# Patient Record
Sex: FEMALE | Race: WHITE | Employment: OTHER | URBAN - METROPOLITAN AREA
[De-identification: names, ages, dates, MRNs, and addresses within clinical notes are randomized per-mention and may not be internally consistent; named-entity substitution may affect disease eponyms.]

---

## 2017-02-21 ENCOUNTER — OFFICE VISIT (OUTPATIENT)
Dept: GASTROENTEROLOGY | Facility: CLINIC | Age: 82
End: 2017-02-21

## 2017-02-21 VITALS
WEIGHT: 125.63 LBS | SYSTOLIC BLOOD PRESSURE: 132 MMHG | HEIGHT: 66 IN | BODY MASS INDEX: 20.19 KG/M2 | HEART RATE: 70 BPM | DIASTOLIC BLOOD PRESSURE: 70 MMHG

## 2017-02-21 DIAGNOSIS — R10.9 ABDOMINAL PAIN IN FEMALE: Primary | ICD-10-CM

## 2017-02-21 DIAGNOSIS — R07.9 CHEST PAIN IN ADULT: ICD-10-CM

## 2017-02-21 PROBLEM — G89.29 CHRONIC PAIN OF RIGHT KNEE: Status: ACTIVE | Noted: 2017-02-21

## 2017-02-21 PROBLEM — M25.561 CHRONIC PAIN OF RIGHT KNEE: Status: ACTIVE | Noted: 2017-02-21

## 2017-02-21 PROBLEM — I10 ESSENTIAL HYPERTENSION: Status: ACTIVE | Noted: 2017-02-21

## 2017-02-21 PROCEDURE — 99214 OFFICE O/P EST MOD 30 MIN: CPT | Performed by: INTERNAL MEDICINE

## 2017-02-21 PROCEDURE — G0463 HOSPITAL OUTPT CLINIC VISIT: HCPCS | Performed by: INTERNAL MEDICINE

## 2017-02-21 NOTE — H&P
History of present illness: This is a very pleasant 41-year-old female referred by Dr. Marry Reilly as an evaluation for abdominal pain. The patient describes abdominal pain for at least 8 or 9 months.   She has 1-2 times per month abdominal pain which is i dyspepsia. She had transient elevated liver enzymes which resolved, suggesting possible intermittent right heart failure. I have advised that the patient proceed with her cardiac stress testing as scheduled for tomorrow.   Then, if stress test negative, t

## 2017-02-21 NOTE — PATIENT INSTRUCTIONS
1.  Cardiac stress testing done tomorrow as scheduled    2.   Schedule MR angiography, abdominal mesenteric vessels

## 2017-02-21 NOTE — PROGRESS NOTES
HPI:    Patient ID: Randy Schneider is a 80year old female. HPI    Review of Systems   Constitutional: Positive for appetite change, fatigue and unexpected weight change. Negative for fever, chills and diaphoresis.    HENT: Negative for congestion, ear p amiodarone HCl (PACERONE) 200 MG Oral Tab Take 100 mg by mouth daily. Disp:  Rfl:    Cholecalciferol (VITAMIN D3) 1000 UNITS Oral Cap Take 2 tablets by mouth daily.  Disp:  Rfl:    Calcium Carbonate Antacid 500 MG Oral Chew Tab Chew 1 tablet by mouth 2 content normal.   Nursing note and vitals reviewed. ASSESSMENT/PLAN:   Abdominal pain in female  (primary encounter diagnosis)  Chest pain in adult    No orders of the defined types were placed in this encounter.        Meds This Visit:  No pres

## 2017-03-08 ENCOUNTER — TELEPHONE (OUTPATIENT)
Dept: GASTROENTEROLOGY | Facility: CLINIC | Age: 82
End: 2017-03-08

## 2017-03-08 ENCOUNTER — HOSPITAL ENCOUNTER (OUTPATIENT)
Dept: MRI IMAGING | Facility: HOSPITAL | Age: 82
Discharge: HOME | End: 2017-03-08
Attending: INTERNAL MEDICINE
Payer: MEDICARE

## 2017-03-08 DIAGNOSIS — R10.9 ABDOMINAL PAIN IN FEMALE: ICD-10-CM

## 2017-03-08 DIAGNOSIS — K55.059 OCCLUSIVE MESENTERIC ISCHEMIA (HCC): ICD-10-CM

## 2017-03-08 DIAGNOSIS — K55.059 OCCLUSIVE MESENTERIC ISCHEMIA (HCC): Primary | ICD-10-CM

## 2017-03-08 LAB — CREAT BLD-MCNC: 1 MG/DL (ref 0.5–1.5)

## 2017-03-08 PROCEDURE — A9575 INJ GADOTERATE MEGLUMI 0.1ML: HCPCS | Performed by: INTERNAL MEDICINE

## 2017-03-08 PROCEDURE — 82565 ASSAY OF CREATININE: CPT

## 2017-03-08 PROCEDURE — 74185 MRA ABD W OR W/O CNTRST: CPT

## 2017-03-08 NOTE — TELEPHONE ENCOUNTER
Dr. Lynn Altamirano - Sending as a high priority and paging. Sindhu Singh, RN from radiology called ([phone]) and states that  1)The order was placed incorrectly. MRI abd was placed. They need an MR Angiography order.    2) Medicare will not cover the current dx listed (abd

## 2017-03-08 NOTE — TELEPHONE ENCOUNTER
Per Dr. Mira Benavides, new order placed for MRA (MRI Angiography 79777) with and without contrast with Dx code 9 [facility]. 059 - venous occlusion, suspected mesenteric ischemia. Vandana Russell RN phoned back [facility] in Radiology and informed order was on it's way.    Confirmed ins

## 2017-03-10 ENCOUNTER — TELEPHONE (OUTPATIENT)
Dept: GASTROENTEROLOGY | Facility: CLINIC | Age: 82
End: 2017-03-10

## 2017-03-10 NOTE — TELEPHONE ENCOUNTER
Message is left with pt.'s  for pt to call back. Dr. Valerio Figueroa was made aware of appt with Dr. Alexandria Stanley next week.

## 2017-03-10 NOTE — TELEPHONE ENCOUNTER
Juan Ramon Herrera at Dr. Josesito Fernandez office is contacted re: below; appt booked for 3/15/17 at 1430- she will call pt re: this.

## 2017-03-10 NOTE — TELEPHONE ENCOUNTER
Pt is contacted and made aware of below communication from Dr. Yamilka Ennis as well as appt on 3/15/17 at [phone] with Dr. Ubaldo Lares; pt is agreeable with plan; she is made aware that Dr. Hopkins [facility] office will be contacted to call pt with information re: location for appt.

## 2017-03-10 NOTE — TELEPHONE ENCOUNTER
I left a message on the patient's voicemail regarding her MRA results. 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 She needs an appointment with Dr. Theodora Porter as soon as possible.   When she calls back

## 2017-03-13 NOTE — TELEPHONE ENCOUNTER
Per Dr. Km Rosales office, pt should park in yellow parking lot at [facility] and go to third floor, [address].

## 2017-03-23 ENCOUNTER — HOSPITAL ENCOUNTER (OUTPATIENT)
Dept: INTERVENTIONAL RADIOLOGY/VASCULAR | Facility: HOSPITAL | Age: 82
Discharge: HOME | End: 2017-03-23
Attending: RADIOLOGY | Admitting: RADIOLOGY
Payer: MEDICARE

## 2017-03-23 VITALS
HEIGHT: 66 IN | DIASTOLIC BLOOD PRESSURE: 59 MMHG | WEIGHT: 125 LBS | OXYGEN SATURATION: 98 % | BODY MASS INDEX: 20.09 KG/M2 | SYSTOLIC BLOOD PRESSURE: 102 MMHG | HEART RATE: 50 BPM | RESPIRATION RATE: 14 BRPM

## 2017-03-23 DIAGNOSIS — K55.9 MESENTERIC ISCHEMIA (HCC): ICD-10-CM

## 2017-03-23 PROCEDURE — 04H53DZ INSERTION OF INTRALUMINAL DEVICE INTO SUPERIOR MESENTERIC ARTERY, PERCUTANEOUS APPROACH: ICD-10-PCS | Performed by: RADIOLOGY

## 2017-03-23 PROCEDURE — 85347 COAGULATION TIME ACTIVATED: CPT

## 2017-03-23 PROCEDURE — 04753DZ DILATION OF SUPERIOR MESENTERIC ARTERY WITH INTRALUMINAL DEVICE, PERCUTANEOUS APPROACH: ICD-10-PCS | Performed by: RADIOLOGY

## 2017-03-23 PROCEDURE — 80048 BASIC METABOLIC PNL TOTAL CA: CPT | Performed by: RADIOLOGY

## 2017-03-23 PROCEDURE — 85027 COMPLETE CBC AUTOMATED: CPT | Performed by: RADIOLOGY

## 2017-03-23 PROCEDURE — 36246 INS CATH ABD/L-EXT ART 2ND: CPT

## 2017-03-23 PROCEDURE — 37236 OPEN/PERQ PLACE STENT 1ST: CPT

## 2017-03-23 PROCEDURE — 75726 ARTERY X-RAYS ABDOMEN: CPT

## 2017-03-23 RX ORDER — SODIUM CHLORIDE 9 MG/ML
INJECTION, SOLUTION INTRAVENOUS CONTINUOUS
Status: DISCONTINUED | OUTPATIENT
Start: 2017-03-23 | End: 2017-03-23

## 2017-03-23 RX ORDER — ASPIRIN 325 MG
325 TABLET ORAL DAILY
Qty: 30 TABLET | Refills: 0 | Status: SHIPPED | OUTPATIENT
Start: 2017-03-23 | End: 2017-04-18 | Stop reason: ALTCHOICE

## 2017-03-23 RX ORDER — HEPARIN SODIUM 1000 [USP'U]/ML
1000 INJECTION, SOLUTION INTRAVENOUS; SUBCUTANEOUS AS NEEDED
Status: DISCONTINUED | OUTPATIENT
Start: 2017-03-23 | End: 2017-03-23

## 2017-03-23 RX ORDER — SODIUM CHLORIDE 9 MG/ML
500 INJECTION, SOLUTION INTRAVENOUS CONTINUOUS
Status: DISCONTINUED | OUTPATIENT
Start: 2017-03-23 | End: 2017-03-23

## 2017-03-23 RX ORDER — ASPIRIN 325 MG
325 TABLET ORAL DAILY
Status: DISCONTINUED | OUTPATIENT
Start: 2017-03-23 | End: 2017-03-23

## 2017-03-23 RX ORDER — MIDAZOLAM HYDROCHLORIDE 1 MG/ML
1 INJECTION INTRAMUSCULAR; INTRAVENOUS EVERY 5 MIN PRN
Status: DISCONTINUED | OUTPATIENT
Start: 2017-03-23 | End: 2017-03-23

## 2017-03-23 RX ORDER — HEPARIN SODIUM 1000 [USP'U]/ML
INJECTION, SOLUTION INTRAVENOUS; SUBCUTANEOUS
Status: COMPLETED
Start: 2017-03-23 | End: 2017-03-23

## 2017-03-23 RX ORDER — MIDAZOLAM HYDROCHLORIDE 1 MG/ML
INJECTION INTRAMUSCULAR; INTRAVENOUS
Status: COMPLETED
Start: 2017-03-23 | End: 2017-03-23

## 2017-03-23 RX ORDER — SODIUM CHLORIDE 9 MG/ML
INJECTION, SOLUTION INTRAVENOUS
Status: COMPLETED
Start: 2017-03-23 | End: 2017-03-23

## 2017-03-23 RX ADMIN — HEPARIN SODIUM 5000 UNITS: 1000 INJECTION, SOLUTION INTRAVENOUS; SUBCUTANEOUS at 09:40:00

## 2017-03-23 RX ADMIN — MIDAZOLAM HYDROCHLORIDE 1 MG: 1 INJECTION INTRAMUSCULAR; INTRAVENOUS at 09:27:00

## 2017-03-23 RX ADMIN — SODIUM CHLORIDE 500 ML/HR: 9 INJECTION, SOLUTION INTRAVENOUS at 09:15:00

## 2017-03-23 RX ADMIN — MIDAZOLAM HYDROCHLORIDE 0.5 MG: 1 INJECTION INTRAMUSCULAR; INTRAVENOUS at 09:51:00

## 2017-03-23 RX ADMIN — HEPARIN SODIUM 3000 UNITS: 1000 INJECTION, SOLUTION INTRAVENOUS; SUBCUTANEOUS at 09:00:00

## 2017-03-23 NOTE — PROGRESS NOTES
Pt post angiogram. Pt was able to eat no complaint of nausea or vomiting. Pt ambulated in the room. Site remained clean dry and intact. Discharge instructions given to pt and family at bedside.  Pt discharged by wheelchair with family

## 2017-04-18 ENCOUNTER — OFFICE VISIT (OUTPATIENT)
Dept: GASTROENTEROLOGY | Facility: CLINIC | Age: 82
End: 2017-04-18

## 2017-04-18 VITALS
HEIGHT: 67 IN | WEIGHT: 124.81 LBS | SYSTOLIC BLOOD PRESSURE: 78 MMHG | HEART RATE: 56 BPM | DIASTOLIC BLOOD PRESSURE: 31 MMHG | BODY MASS INDEX: 19.59 KG/M2

## 2017-04-18 DIAGNOSIS — K55.9 MESENTERIC ISCHEMIA (HCC): Primary | ICD-10-CM

## 2017-04-18 DIAGNOSIS — Z87.19 HX OF GASTROESOPHAGEAL REFLUX (GERD): ICD-10-CM

## 2017-04-18 PROCEDURE — G0463 HOSPITAL OUTPT CLINIC VISIT: HCPCS | Performed by: INTERNAL MEDICINE

## 2017-04-18 PROCEDURE — 99214 OFFICE O/P EST MOD 30 MIN: CPT | Performed by: INTERNAL MEDICINE

## 2017-04-18 RX ORDER — APIXABAN 5 MG/1
5 TABLET, FILM COATED ORAL 2 TIMES DAILY
Refills: 6 | COMMUNITY
Start: 2017-04-06 | End: 2018-03-22 | Stop reason: DRUGHIGH

## 2017-04-18 RX ORDER — ENALAPRIL MALEATE 2.5 MG/1
TABLET ORAL DAILY
Refills: 2 | COMMUNITY
Start: 2017-04-05 | End: 2020-11-12

## 2017-04-18 RX ORDER — FAMOTIDINE 10 MG
10 TABLET ORAL 2 TIMES DAILY
COMMUNITY
End: 2017-07-10

## 2017-04-18 NOTE — PATIENT INSTRUCTIONS
1.  Continue famotidine 10 mg twice daily as needed for GERD. 2.  Return visit in one year     3. Call if abdominal pain recurs.

## 2017-04-18 NOTE — H&P
History of present illness: This is an 57-year-old female patient of Dr. Hanna Restrepo who I last saw on February of this year. At that time she had presented with abdominal pain which was suggestive of possible mesenteric ischemia.   She underwent magnetic

## 2017-04-18 NOTE — PROGRESS NOTES
HPI:    Patient ID: Ruba Selby is a 80year old female. HPI    Review of Systems   Constitutional: Positive for fatigue. Negative for fever, chills, diaphoresis, activity change, appetite change and unexpected weight change.    HENT: Negative for con Take 1 tablet (40 mg total) by mouth nightly. Disp:  Rfl: 0   amiodarone HCl (PACERONE) 200 MG Oral Tab Take 100 mg by mouth daily. Disp:  Rfl:    Cholecalciferol (VITAMIN D3) 1000 UNITS Oral Cap Take 2 tablets by mouth daily.  Disp:  Rfl:      Allergies: diaphoretic. No erythema. No pallor. Psychiatric: She has a normal mood and affect. Her behavior is normal. Judgment and thought content normal.   Nursing note and vitals reviewed.              ASSESSMENT/PLAN:   Mesenteric ischemia (hcc)  (primary encoun

## 2017-06-20 ENCOUNTER — TELEPHONE (OUTPATIENT)
Dept: GASTROENTEROLOGY | Facility: CLINIC | Age: 82
End: 2017-06-20

## 2017-06-20 ENCOUNTER — OFFICE VISIT (OUTPATIENT)
Dept: GASTROENTEROLOGY | Facility: CLINIC | Age: 82
End: 2017-06-20

## 2017-06-20 VITALS
DIASTOLIC BLOOD PRESSURE: 54 MMHG | BODY MASS INDEX: 19 KG/M2 | HEART RATE: 54 BPM | SYSTOLIC BLOOD PRESSURE: 106 MMHG | WEIGHT: 124.19 LBS

## 2017-06-20 DIAGNOSIS — K55.9 MESENTERIC ISCHEMIA (HCC): ICD-10-CM

## 2017-06-20 DIAGNOSIS — Z87.19 HX OF GASTROESOPHAGEAL REFLUX (GERD): Primary | ICD-10-CM

## 2017-06-20 DIAGNOSIS — B96.81 HELICOBACTER PYLORI GASTRITIS: ICD-10-CM

## 2017-06-20 DIAGNOSIS — R43.2 DYSGEUSIA: ICD-10-CM

## 2017-06-20 DIAGNOSIS — K29.70 HELICOBACTER PYLORI GASTRITIS: ICD-10-CM

## 2017-06-20 PROCEDURE — G0463 HOSPITAL OUTPT CLINIC VISIT: HCPCS | Performed by: INTERNAL MEDICINE

## 2017-06-20 PROCEDURE — 99214 OFFICE O/P EST MOD 30 MIN: CPT | Performed by: INTERNAL MEDICINE

## 2017-06-20 RX ORDER — RABEPRAZOLE SODIUM 20 MG/1
20 TABLET, DELAYED RELEASE ORAL DAILY
Qty: 30 TABLET | Refills: 11 | Status: SHIPPED | OUTPATIENT
Start: 2017-06-20 | End: 2017-06-20

## 2017-06-20 RX ORDER — ESOMEPRAZOLE MAGNESIUM 40 MG/1
40 CAPSULE, DELAYED RELEASE ORAL
Qty: 30 CAPSULE | Refills: 6 | Status: SHIPPED | OUTPATIENT
Start: 2017-06-20 | End: 2017-07-10

## 2017-06-20 NOTE — H&P
History of present illness: This is an 51-year-old female patient of Dr. Erasmo Cooper who I last saw in April of this year.   At that time she had presented with abdominal pain and underwent magnetic resonance angiography and was found to have a stenosis of

## 2017-06-20 NOTE — PATIENT INSTRUCTIONS
1.  Trial of rabeprazole 20 mg 30 minutes before breakfast    2. Okay to continue cimetidine for breakthrough symptoms twice daily    3. Check stool test for H. pylori antigen    4.   Continue antireflux precautions

## 2017-06-20 NOTE — PROGRESS NOTES
HPI:    Patient ID: Varghese Louise is a 80year old female. HPI    Review of Systems   Constitutional: Positive for fatigue. Negative for fever, chills, diaphoresis, activity change, appetite change and unexpected weight change.    HENT: Negative for con tablet Rfl: 3   furosemide 20 MG Oral Tab Take 1 tablet (20 mg total) by mouth daily.  Disp: 90 tablet Rfl: 3   CARVEDILOL 6.25 MG Oral Tab TAKE 1 TABLET TWICE A DAY WITH MEALS Disp: 60 tablet Rfl: 6   Pravastatin Sodium 40 MG Oral Tab Take 1 tablet (40 mg adenopathy. Neurological: She is alert and oriented to person, place, and time. Skin: Skin is warm and dry. No rash noted. She is not diaphoretic. No erythema. No pallor. Psychiatric: She has a normal mood and affect.  Her behavior is normal. Judgment

## 2017-06-20 NOTE — TELEPHONE ENCOUNTER
[facility] called regarding RX RABEprazole not covered by ins. Pharmacy indicates that their are three medications currently covered listed under a different name: Omeprazole; Tantoprazole; Nexium.   Pls call Beth Stern at: [phone], thank you

## 2017-06-27 ENCOUNTER — APPOINTMENT (OUTPATIENT)
Dept: LAB | Facility: HOSPITAL | Age: 82
End: 2017-06-27
Attending: INTERNAL MEDICINE
Payer: MEDICARE

## 2017-06-27 ENCOUNTER — TELEPHONE (OUTPATIENT)
Dept: NEUROLOGY | Facility: CLINIC | Age: 82
End: 2017-06-27

## 2017-06-27 DIAGNOSIS — K29.70 HELICOBACTER PYLORI GASTRITIS: ICD-10-CM

## 2017-06-27 DIAGNOSIS — B96.81 HELICOBACTER PYLORI GASTRITIS: ICD-10-CM

## 2017-06-27 PROCEDURE — 87338 HPYLORI STOOL AG IA: CPT

## 2017-06-28 ENCOUNTER — TELEPHONE (OUTPATIENT)
Dept: NEUROLOGY | Facility: CLINIC | Age: 82
End: 2017-06-28

## 2017-06-28 ENCOUNTER — LAB ENCOUNTER (OUTPATIENT)
Dept: LAB | Facility: HOSPITAL | Age: 82
End: 2017-06-28
Attending: Other
Payer: MEDICARE

## 2017-06-28 ENCOUNTER — OFFICE VISIT (OUTPATIENT)
Dept: NEUROLOGY | Facility: CLINIC | Age: 82
End: 2017-06-28

## 2017-06-28 VITALS
SYSTOLIC BLOOD PRESSURE: 114 MMHG | HEART RATE: 64 BPM | HEIGHT: 66 IN | RESPIRATION RATE: 16 BRPM | WEIGHT: 124 LBS | DIASTOLIC BLOOD PRESSURE: 60 MMHG | BODY MASS INDEX: 19.93 KG/M2

## 2017-06-28 DIAGNOSIS — G31.84 MCI (MILD COGNITIVE IMPAIRMENT) WITH MEMORY LOSS: ICD-10-CM

## 2017-06-28 DIAGNOSIS — G62.9 DEMYELINATING NEUROPATHY: Primary | ICD-10-CM

## 2017-06-28 DIAGNOSIS — G62.9 NEUROPATHY: ICD-10-CM

## 2017-06-28 DIAGNOSIS — G62.9 NEUROPATHY: Primary | ICD-10-CM

## 2017-06-28 PROCEDURE — 84425 ASSAY OF VITAMIN B-1: CPT

## 2017-06-28 PROCEDURE — 86038 ANTINUCLEAR ANTIBODIES: CPT | Performed by: OTHER

## 2017-06-28 PROCEDURE — 36415 COLL VENOUS BLD VENIPUNCTURE: CPT

## 2017-06-28 PROCEDURE — 86039 ANTINUCLEAR ANTIBODIES (ANA): CPT | Performed by: OTHER

## 2017-06-28 PROCEDURE — 99204 OFFICE O/P NEW MOD 45 MIN: CPT | Performed by: OTHER

## 2017-06-28 PROCEDURE — 86334 IMMUNOFIX E-PHORESIS SERUM: CPT

## 2017-06-28 PROCEDURE — 84165 PROTEIN E-PHORESIS SERUM: CPT

## 2017-06-28 NOTE — PROGRESS NOTES
She relates a 4–5 year history of numbness paresthesias in the toes feet which has progressed, ascending to the distal calf. There is episodic right ulnar numbness paresthesias. She denies actual pain in the feet. No weakness in the hands.   Long history (PACERONE) 200 MG Oral Tab Take 200 mg by mouth daily. Disp:  Rfl:    Cholecalciferol (VITAMIN D3) 1000 UNITS Oral Cap Take 2 tablets by mouth daily.  Disp:  Rfl:    Esomeprazole Magnesium 40 MG Oral Capsule Delayed Release Take 1 capsule (40 mg total) by Number of children:               Social History Main Topics    Smoking status: Never Smoker                                                                Smokeless tobacco: Never Used                        Alcohol use:  No              Drug use: N to Vibration, temperature, fine touch, proprioception and pin prick to the UE and LE-except for vibration -2 -3 at the ankles. Decrease light touch symmetrically starting of the distal calf. Slight decreased temperature sensation of the feet.   No objecti total) by mouth daily. Disp: 90 tablet Rfl: 3   CARVEDILOL 6.25 MG Oral Tab TAKE 1 TABLET TWICE A DAY WITH MEALS Disp: 60 tablet Rfl: 6   Pravastatin Sodium 40 MG Oral Tab Take 1 tablet (40 mg total) by mouth nightly.  Disp:  Rfl: 0   amiodarone HCl (PACERO

## 2017-06-28 NOTE — TELEPHONE ENCOUNTER
Pt. informed insurance was verified and CT brain/head wo is a covered benefit and does not require authorization. Can proceed with scheduling appt. Ct appt. is on 07/06/17.

## 2017-06-29 LAB — HELICOBACTER PYLORI AG, FECAL: NEGATIVE

## 2017-07-05 ENCOUNTER — TELEPHONE (OUTPATIENT)
Dept: GASTROENTEROLOGY | Facility: CLINIC | Age: 82
End: 2017-07-05

## 2017-07-06 ENCOUNTER — TELEPHONE (OUTPATIENT)
Dept: GASTROENTEROLOGY | Facility: CLINIC | Age: 82
End: 2017-07-06

## 2017-07-06 ENCOUNTER — HOSPITAL ENCOUNTER (OUTPATIENT)
Dept: CT IMAGING | Facility: HOSPITAL | Age: 82
Discharge: HOME | End: 2017-07-06
Attending: Other
Payer: MEDICARE

## 2017-07-06 DIAGNOSIS — G31.84 MCI (MILD COGNITIVE IMPAIRMENT) WITH MEMORY LOSS: ICD-10-CM

## 2017-07-06 PROCEDURE — 70450 CT HEAD/BRAIN W/O DYE: CPT | Performed by: OTHER

## 2017-07-10 PROBLEM — E78.00 PURE HYPERCHOLESTEROLEMIA: Status: ACTIVE | Noted: 2017-07-10

## 2017-07-10 PROBLEM — G60.8 SENSORY PERIPHERAL NEUROPATHY: Status: ACTIVE | Noted: 2017-07-10

## 2017-07-10 PROBLEM — R41.3 MEMORY DEFICIT: Status: ACTIVE | Noted: 2017-07-10

## 2017-07-26 ENCOUNTER — OFFICE VISIT (OUTPATIENT)
Dept: PHYSICAL THERAPY | Facility: HOSPITAL | Age: 82
End: 2017-07-26
Attending: ORTHOPAEDIC SURGERY
Payer: MEDICARE

## 2017-07-26 DIAGNOSIS — M17.0 OSTEOARTHRITIS OF BOTH KNEES: ICD-10-CM

## 2017-07-26 DIAGNOSIS — M41.9 SCOLIOSIS, UNSPECIFIED: ICD-10-CM

## 2017-07-26 DIAGNOSIS — M99.06 SEGMENTAL AND SOMATIC DYSFUNCTION OF LOWER EXTREMITY: ICD-10-CM

## 2017-07-26 DIAGNOSIS — M54.16 LUMBAR RADICULOPATHY: ICD-10-CM

## 2017-07-26 DIAGNOSIS — M99.04 SEGMENTAL AND SOMATIC DYSFUNCTION OF SACRAL REGION: ICD-10-CM

## 2017-07-26 DIAGNOSIS — G57.62 LESION OF LEFT PLANTAR NERVE: ICD-10-CM

## 2017-07-26 DIAGNOSIS — M46.1 SACROILIITIS, NOT ELSEWHERE CLASSIFIED (HCC): ICD-10-CM

## 2017-07-26 PROCEDURE — 97162 PT EVAL MOD COMPLEX 30 MIN: CPT | Performed by: PHYSICAL THERAPIST

## 2017-07-26 NOTE — PROGRESS NOTES
LUMBAR SPINE EVALUATION:   Referring Physician: Dr. 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     Precautions: None     OBJECTIVE:   Observation/Posture: Poor B rounded shoulders and slouched at the L-spine  Response to OC posture:NE  Response to slouch posture: NE      Strength   Right Left Comments   Hip Flexio <3sec, 0 needs help from falling)  ___4____7. Standing with feet together  (4 1min, 3 1 min supervision, 2 30sec, 2 needs help to attain position and 15sec, 0 need help <15sec)  ___3____8.  Reaching forward with outstretched arm (4 >10\", 3 >5\", 2 >2\", 1 Patient will be seen for 2x/week or a total of 10 visits over a 90 day period. Treatment will include: Manual Therapy; Therapeutic Exercises; Neuromuscular Re-education; Therapeutic Activity; Ultrasound;  Electrical Stim; Traction; Patient education: Home

## 2017-07-31 ENCOUNTER — OFFICE VISIT (OUTPATIENT)
Dept: PHYSICAL THERAPY | Facility: HOSPITAL | Age: 82
End: 2017-07-31
Attending: ORTHOPAEDIC SURGERY
Payer: MEDICARE

## 2017-07-31 PROCEDURE — 97110 THERAPEUTIC EXERCISES: CPT

## 2017-07-31 NOTE — PROGRESS NOTES
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

## 2017-08-03 ENCOUNTER — OFFICE VISIT (OUTPATIENT)
Dept: PHYSICAL THERAPY | Facility: HOSPITAL | Age: 82
End: 2017-08-03
Attending: ORTHOPAEDIC SURGERY
Payer: MEDICARE

## 2017-08-03 PROCEDURE — 97140 MANUAL THERAPY 1/> REGIONS: CPT

## 2017-08-03 PROCEDURE — 97110 THERAPEUTIC EXERCISES: CPT

## 2017-08-08 ENCOUNTER — OFFICE VISIT (OUTPATIENT)
Dept: PHYSICAL THERAPY | Facility: HOSPITAL | Age: 82
End: 2017-08-08
Attending: ORTHOPAEDIC SURGERY
Payer: MEDICARE

## 2017-08-08 PROCEDURE — 97140 MANUAL THERAPY 1/> REGIONS: CPT | Performed by: PHYSICAL THERAPIST

## 2017-08-08 PROCEDURE — 97110 THERAPEUTIC EXERCISES: CPT | Performed by: PHYSICAL THERAPIST

## 2017-08-10 NOTE — PROGRESS NOTES
Her main concern is memory, occasional word finding difficulty. I reviewed all her blood tests, CT the head. No change in other neurological symptoms. No headache or neck pain. No symptoms in the upper extremities.     Review of systems negative in 12 f

## 2017-08-11 ENCOUNTER — OFFICE VISIT (OUTPATIENT)
Dept: PHYSICAL THERAPY | Facility: HOSPITAL | Age: 82
End: 2017-08-11
Attending: ORTHOPAEDIC SURGERY
Payer: MEDICARE

## 2017-08-11 PROCEDURE — 97110 THERAPEUTIC EXERCISES: CPT | Performed by: PHYSICAL THERAPIST

## 2017-08-11 PROCEDURE — 97112 NEUROMUSCULAR REEDUCATION: CPT | Performed by: PHYSICAL THERAPIST

## 2017-08-14 ENCOUNTER — OFFICE VISIT (OUTPATIENT)
Dept: PHYSICAL THERAPY | Facility: HOSPITAL | Age: 82
End: 2017-08-14
Attending: ORTHOPAEDIC SURGERY
Payer: MEDICARE

## 2017-08-14 PROCEDURE — 97116 GAIT TRAINING THERAPY: CPT

## 2017-08-14 PROCEDURE — 97112 NEUROMUSCULAR REEDUCATION: CPT

## 2017-08-14 PROCEDURE — 97110 THERAPEUTIC EXERCISES: CPT

## 2017-08-14 NOTE — PROGRESS NOTES
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

## 2017-08-23 ENCOUNTER — OFFICE VISIT (OUTPATIENT)
Dept: PHYSICAL THERAPY | Facility: HOSPITAL | Age: 82
End: 2017-08-23
Attending: ORTHOPAEDIC SURGERY
Payer: MEDICARE

## 2017-08-23 PROCEDURE — 97110 THERAPEUTIC EXERCISES: CPT | Performed by: PHYSICAL THERAPIST

## 2017-08-25 ENCOUNTER — OFFICE VISIT (OUTPATIENT)
Dept: PHYSICAL THERAPY | Facility: HOSPITAL | Age: 82
End: 2017-08-25
Attending: ORTHOPAEDIC SURGERY
Payer: MEDICARE

## 2017-08-25 PROCEDURE — 97110 THERAPEUTIC EXERCISES: CPT | Performed by: PHYSICAL THERAPIST

## 2017-08-25 NOTE — PROGRESS NOTES
Patient Name: Melinda Patel, : 10/17/1932, MRN: [id number]   Date:  2017  Referring Physician:  Nathaniel Lambert    Diagnosis: No diagnosis found. Progress note    Pt has attended 7, cancelled 0, and no shown 0 visits in Physical Therapy.      P 15sec, 0 need help <15sec)  ___4____8. Reaching forward with outstretched arm (4 >10\", 3 >5\", 2 >2\", 1 supervision, 0 needs help to prevent from falling)  ___4____9.  Retrieving object from floor (4 easy, 3 supervision, 2 unable but reaches 1-2\", 1unabl actively participate in planning and for this course of care. Thank you for your referral. Please co-sign or sign and return this letter via fax as soon as possible to [phone]. If you have any questions, please contact me at Dept: [phone]. 15 each -not today    Ice pack to B glut in supine x 10 minutes    Assessment:See progress note  HEP See pt instructions      Plan: See progress note        Charges:  TherEx3    Total Timed Treatment: 38 min  Total Treatment Time: 58 min

## 2017-09-27 NOTE — PROGRESS NOTES
Patient Name: Chris Coats, : 10/17/1932, MRN: [id number]   Date:  2017  Referring Physician:  Vida Rizo    Diagnosis: Scoliosis, unspecified (M41.9)  Osteoarthritis of both knees (M17.0)  Lumbar radiculopathy (M54.16)  Lesion of left plan

## 2017-10-03 ENCOUNTER — LAB ENCOUNTER (OUTPATIENT)
Dept: LAB | Facility: HOSPITAL | Age: 82
End: 2017-10-03
Attending: INTERNAL MEDICINE
Payer: MEDICARE

## 2017-10-03 DIAGNOSIS — I48.91 ATRIAL FIBRILLATION (HCC): Primary | ICD-10-CM

## 2017-10-03 PROCEDURE — 36415 COLL VENOUS BLD VENIPUNCTURE: CPT

## 2017-10-03 PROCEDURE — 85520 HEPARIN ASSAY: CPT

## 2018-07-20 ENCOUNTER — TELEPHONE (OUTPATIENT)
Dept: PHYSICAL THERAPY | Facility: HOSPITAL | Age: 83
End: 2018-07-20

## 2018-07-23 ENCOUNTER — OFFICE VISIT (OUTPATIENT)
Dept: PHYSICAL THERAPY | Facility: HOSPITAL | Age: 83
End: 2018-07-23
Attending: ORTHOPAEDIC SURGERY
Payer: MEDICARE

## 2018-07-23 DIAGNOSIS — M54.50 LUMBAR PAIN: ICD-10-CM

## 2018-07-23 DIAGNOSIS — M25.561 CHRONIC PAIN OF RIGHT KNEE: ICD-10-CM

## 2018-07-23 DIAGNOSIS — M25.561 RIGHT KNEE PAIN: Primary | ICD-10-CM

## 2018-07-23 DIAGNOSIS — G89.29 CHRONIC PAIN OF RIGHT KNEE: ICD-10-CM

## 2018-07-23 PROCEDURE — 97110 THERAPEUTIC EXERCISES: CPT | Performed by: PHYSICAL THERAPIST

## 2018-07-23 PROCEDURE — 97162 PT EVAL MOD COMPLEX 30 MIN: CPT | Performed by: PHYSICAL THERAPIST

## 2018-07-23 NOTE — PROGRESS NOTES
BACK EVALUATION:    Referring Physician: Javier Da Silva    DX Code: Right knee pain (M25.561)  Lumbar pain (M54.5)     PT DX: Right knee pain (M25.561)  Lumbar pain (M54.5)    PCP: Rian Perez MD     Age: 80year old  Occupation: retired    DOI Palpation Summary: tender over lower back, right anterior knee. Additional Information / Findings:  -  Today’s Treatment:  LINDA, prone knee bend, manual knee ext mobs, supine knee flexion. HEP: Handouts given  Pt.  Education: Patient counseled to maint · Increase strength of R knee to 4+-5/5 to allow pt to negotiate stairs reciprocally using railing, bend and stoop without increase in pain to allow light gardening.   · LEISURE:  Pt will be able to return to previous level of function for leisure activitie • History of repair of right rotator cuff     at [facility]   • History of shingles    • HTN (hypertension)    • Hyperlipidemia    • Hypothyroid    • Ischemic cardiomyopathy    • L-S radiculopathy     has been seen by Dr. Liudmila Birmingham; no sulaiman

## 2018-07-26 ENCOUNTER — OFFICE VISIT (OUTPATIENT)
Dept: PHYSICAL THERAPY | Facility: HOSPITAL | Age: 83
End: 2018-07-26
Attending: ORTHOPAEDIC SURGERY
Payer: MEDICARE

## 2018-07-26 PROCEDURE — 97140 MANUAL THERAPY 1/> REGIONS: CPT | Performed by: PHYSICAL THERAPIST

## 2018-07-26 PROCEDURE — 97110 THERAPEUTIC EXERCISES: CPT | Performed by: PHYSICAL THERAPIST

## 2018-07-26 NOTE — PROGRESS NOTES
Diagnosis: Right knee pain (M25.561)  Lumbar pain (M54.5)   Authorized # of Visits:  10         Next MD visit: none scheduled  Fall Risk: standard         Precautions: n/a           Medication Changes since last visit?: No  Subjective: doing HEP, feeling b Treatment Time: 45 min

## 2018-07-26 NOTE — PROGRESS NOTES
BACK EVALUATION:    Referring Physician: No ref.  provider found    DX Code: Right knee pain (M25.561)  Lumbar pain (M54.5)     PT DX: Right knee pain (M25.561)  Lumbar pain (M54.5)    PCP: Danilo Garcia MD     Age: 80year old  Occupation: retired    DO Palpation Summary: tender over lower back, right anterior knee. Additional Information / Findings:  -  Today’s Treatment:  LINDA, prone knee bend, manual knee ext mobs, supine knee flexion. HEP: Handouts given  Pt.  Education: Patient counseled to maint · Neutral spine training                                                             Pt. was advised regarding the findings of this evaluation and agrees to the plan of care.    Therapeutic Exercise (15 min ea)  29896: 9 minutes  Physical Therapy Eval.  In ID# [id number]

## 2018-07-31 ENCOUNTER — OFFICE VISIT (OUTPATIENT)
Dept: PHYSICAL THERAPY | Facility: HOSPITAL | Age: 83
End: 2018-07-31
Attending: ORTHOPAEDIC SURGERY
Payer: MEDICARE

## 2018-07-31 PROCEDURE — 97110 THERAPEUTIC EXERCISES: CPT | Performed by: PHYSICAL THERAPIST

## 2018-07-31 PROCEDURE — 97140 MANUAL THERAPY 1/> REGIONS: CPT | Performed by: PHYSICAL THERAPIST

## 2018-07-31 NOTE — PROGRESS NOTES
Diagnosis: Right knee pain (M25.561)  Lumbar pain (M54.5)   Authorized # of Visits:  10         Next MD visit: none scheduled  Fall Risk: standard         Precautions: n/a           Medication Changes since last visit?: No  Subjective: doing HEP, feeling b previous level of function for leisure activities  · Pt. will be able to perform ADLs with min symptoms. · Pt. will be independent with home exercise program and self management.     Plan: pt to cont current HEP, assess response and gradually increase acti

## 2018-08-07 ENCOUNTER — OFFICE VISIT (OUTPATIENT)
Dept: PHYSICAL THERAPY | Facility: HOSPITAL | Age: 83
End: 2018-08-07
Attending: ORTHOPAEDIC SURGERY
Payer: MEDICARE

## 2018-08-07 PROCEDURE — 97110 THERAPEUTIC EXERCISES: CPT | Performed by: PHYSICAL THERAPIST

## 2018-08-07 PROCEDURE — 97140 MANUAL THERAPY 1/> REGIONS: CPT | Performed by: PHYSICAL THERAPIST

## 2018-08-07 NOTE — PROGRESS NOTES
Diagnosis: Right knee pain (M25.561)  Lumbar pain (M54.5)   Authorized # of Visits:  10         Next MD visit: none scheduled  Fall Risk: standard         Precautions: n/a           Medication Changes since last visit?: No  Subjective: doing HEP, feeling b ascending. Goals:    to be reached in 8-10 visits. · Pt. will report decreased pain from 7/10 to 4/10 at worst.  · Increase passive range of motion of R knee to 0-140 degrees.   - MET 7/31/18  · Increase strength of R knee to 4+-5/5 to allow pt to neg

## 2018-08-09 ENCOUNTER — OFFICE VISIT (OUTPATIENT)
Dept: PHYSICAL THERAPY | Facility: HOSPITAL | Age: 83
End: 2018-08-09
Attending: ORTHOPAEDIC SURGERY
Payer: MEDICARE

## 2018-08-09 PROCEDURE — 97140 MANUAL THERAPY 1/> REGIONS: CPT | Performed by: PHYSICAL THERAPIST

## 2018-08-09 PROCEDURE — 97110 THERAPEUTIC EXERCISES: CPT | Performed by: PHYSICAL THERAPIST

## 2018-08-09 NOTE — PROGRESS NOTES
Diagnosis: Right knee pain (M25.561)  Lumbar pain (M54.5)   Authorized # of Visits:  10         Next MD visit: none scheduled  Fall Risk: standard         Precautions: n/a           Medication Changes since last visit?: No  Subjective: doing HEP, feeling b 3x10, dec, NB R LB soreness LINDA 3x10, dec, NB R LB soreness LINDA 3x10, dec, NB R LB soreness LINDA 3x10, nil       Stand hip abd and ext 1x10 bilaterally each Stand hip abd and ext 2x10 bilaterally each Stand hip abd and ext 2x10 bilaterally each       Sta

## 2018-08-14 ENCOUNTER — OFFICE VISIT (OUTPATIENT)
Dept: PHYSICAL THERAPY | Facility: HOSPITAL | Age: 83
End: 2018-08-14
Attending: ORTHOPAEDIC SURGERY
Payer: MEDICARE

## 2018-08-14 PROCEDURE — 97110 THERAPEUTIC EXERCISES: CPT | Performed by: PHYSICAL THERAPIST

## 2018-08-14 PROCEDURE — 97140 MANUAL THERAPY 1/> REGIONS: CPT | Performed by: PHYSICAL THERAPIST

## 2018-08-14 NOTE — PROGRESS NOTES
Diagnosis: Right knee pain (M25.561)  Lumbar pain (M54.5)   Authorized # of Visits:  10         Next MD visit: none scheduled  Fall Risk: standard         Precautions: n/a           Medication Changes since last visit?: No  Subjective: doing HEP, feeling b tibia x 2 min - Man R knee flex mobs with ER of tibia x 2 min -     Prone knee bends 2x10 bilaterally, NE Prone knee bends 2x10 bilaterally, NE Prone knee bends 2x10 bilaterally, NE Prone knee bends 2x10 bilaterally, NE -     LINDA 3x10, dec, NB R LB sorene

## 2018-08-16 ENCOUNTER — OFFICE VISIT (OUTPATIENT)
Dept: PHYSICAL THERAPY | Facility: HOSPITAL | Age: 83
End: 2018-08-16
Attending: ORTHOPAEDIC SURGERY
Payer: MEDICARE

## 2018-08-16 PROCEDURE — 97110 THERAPEUTIC EXERCISES: CPT | Performed by: PHYSICAL THERAPIST

## 2018-08-16 PROCEDURE — 97140 MANUAL THERAPY 1/> REGIONS: CPT | Performed by: PHYSICAL THERAPIST

## 2018-08-16 NOTE — PROGRESS NOTES
Diagnosis: Right knee pain (M25.561)  Lumbar pain (M54.5)   Authorized # of Visits:  10         Next MD visit: none scheduled  Fall Risk: standard         Precautions: n/a           Medication Changes since last visit?: No  Subjective: doing HEP, feeling s 5 min    Man ext OP R knee x 2 min Man ext OP R knee x 2 min Man ext OP R knee x 2 min Man ext OP R knee x 2 min Man ext OP R knee x 2 min Man ext OP R knee x 2 min    Man R knee flex mobs with ER of tibia x 2 min Man R knee flex mobs with ER of tibia x 2

## 2018-08-21 ENCOUNTER — OFFICE VISIT (OUTPATIENT)
Dept: PHYSICAL THERAPY | Facility: HOSPITAL | Age: 83
End: 2018-08-21
Attending: ORTHOPAEDIC SURGERY
Payer: MEDICARE

## 2018-08-21 PROCEDURE — 97110 THERAPEUTIC EXERCISES: CPT | Performed by: PHYSICAL THERAPIST

## 2018-08-21 PROCEDURE — 97140 MANUAL THERAPY 1/> REGIONS: CPT | Performed by: PHYSICAL THERAPIST

## 2018-08-21 NOTE — PROGRESS NOTES
Diagnosis: Right knee pain (M25.561)  Lumbar pain (M54.5)   Authorized # of Visits:  10         Next MD visit: none scheduled  Fall Risk: standard         Precautions: n/a           Medication Changes since last visit?: No  Subjective: overdid housework ov mobs gr 3 x 5 min Man pat distal mobs gr 3 x 6 min, med/lat mobs gr 3 x 5 min Man pat distal mobs gr 3 x 6 min, med/lat mobs gr 3 x 4 min   Man ext OP R knee x 2 min Man ext OP R knee x 2 min Man ext OP R knee x 2 min Man ext OP R knee x 2 min Man ext OP R self management. Plan: pt to cont current HEP, cont using cane unless she feels the need to use walker for safety out of the house.     Skilled Services: TE. MT    Charges: Te2, MT1       Total Timed Treatment: 41 min  Total Treatment Time: 45 min

## 2018-08-23 ENCOUNTER — OFFICE VISIT (OUTPATIENT)
Dept: PHYSICAL THERAPY | Facility: HOSPITAL | Age: 83
End: 2018-08-23
Attending: ORTHOPAEDIC SURGERY
Payer: MEDICARE

## 2018-08-23 PROCEDURE — 97140 MANUAL THERAPY 1/> REGIONS: CPT | Performed by: PHYSICAL THERAPIST

## 2018-08-23 PROCEDURE — 97110 THERAPEUTIC EXERCISES: CPT | Performed by: PHYSICAL THERAPIST

## 2018-08-23 NOTE — PROGRESS NOTES
Diagnosis: Right knee pain (M25.561)  Lumbar pain (M54.5)   Authorized # of Visits:  10         Next MD visit: none scheduled  Fall Risk: standard         Precautions: n/a           Medication Changes since last visit?: No  Subjective:  R knee feels stiff, min Man pat distal mobs gr 3 x 4 min, med/lat mobs gr 3 x 3 min Man pat distal mobs gr 3 x 4 min, med/lat mobs gr 3 x 3 min Man pat distal mobs gr 3 x 4 min, med/lat mobs gr 3 x 3 min Man pat distal mobs gr 3 x 6 min, med/lat mobs gr 3 x 5 min Man pat dist knee to 0-140 degrees. - MET 7/31/18  · Increase strength of R knee to 4+-5/5 to allow pt to negotiate stairs reciprocally using railing, bend and stoop without increase in pain to allow light gardening.   · LEISURE:  Pt will be able to return to previous

## 2018-08-28 ENCOUNTER — OFFICE VISIT (OUTPATIENT)
Dept: PHYSICAL THERAPY | Facility: HOSPITAL | Age: 83
End: 2018-08-28
Attending: ORTHOPAEDIC SURGERY
Payer: MEDICARE

## 2018-08-28 PROCEDURE — 97110 THERAPEUTIC EXERCISES: CPT | Performed by: PHYSICAL THERAPIST

## 2018-08-28 NOTE — PROGRESS NOTES
DISCHARGE NOTE    Diagnosis: Right knee pain (M25.561)  Lumbar pain (M54.5)   Authorized # of Visits:  10         Next MD visit: none scheduled  Fall Risk: standard         Precautions: n/a           Medication Changes since last visit?: No  Subjective:  R w sw ball x 60 Supine DKTC w sw ball x 60      Supine bridge w sw ball 3x10 Supine bridge w sw ball 3x10 - Supine bridge w sw ball 3x10 Supine bridge x 30 Supine bridge x 30   Man pat distal mobs gr 3 x 4 min, med/lat mobs gr 3 x 3 min Man pat distal mobs Stand heel raise x 20     Stairs x 5 min Stairs x 5 min - Stairs x 5 min - Stairs x 6 min Stairs x 6 min       Assessment: responding well,no pain with walking - overall good progress made and goals met. Goals: - all met   to be reached in 8-10 visits.

## 2019-03-11 PROCEDURE — 87086 URINE CULTURE/COLONY COUNT: CPT | Performed by: INTERNAL MEDICINE

## 2019-03-11 PROCEDURE — 87186 SC STD MICRODIL/AGAR DIL: CPT | Performed by: INTERNAL MEDICINE

## 2019-03-11 PROCEDURE — 87088 URINE BACTERIA CULTURE: CPT | Performed by: INTERNAL MEDICINE

## 2020-03-02 PROBLEM — M46.1 SACROILIITIS, NOT ELSEWHERE CLASSIFIED (HCC): Status: ACTIVE | Noted: 2020-03-02

## 2020-03-02 PROBLEM — K55.059 OCCLUSIVE MESENTERIC ISCHEMIA (HCC): Status: ACTIVE | Noted: 2020-03-02

## 2020-03-02 PROBLEM — I50.21 ACUTE SYSTOLIC CHF (CONGESTIVE HEART FAILURE) (HCC): Status: ACTIVE | Noted: 2020-03-02

## 2020-11-17 ENCOUNTER — OFFICE VISIT (OUTPATIENT)
Dept: PHYSICAL THERAPY | Age: 85
End: 2020-11-17
Attending: INTERNAL MEDICINE
Payer: MEDICARE

## 2020-11-17 DIAGNOSIS — Z91.81 AT HIGH RISK FOR FALLS: ICD-10-CM

## 2020-11-17 DIAGNOSIS — R27.0 ATAXIA: ICD-10-CM

## 2020-11-17 PROCEDURE — 97110 THERAPEUTIC EXERCISES: CPT

## 2020-11-17 PROCEDURE — 97161 PT EVAL LOW COMPLEX 20 MIN: CPT

## 2020-11-17 NOTE — PROGRESS NOTES
P.T. EVALUATION:   Referring Physician: Dr. Jay Colon  Diagnosis: Ataxia (R27.0)  At high risk for falls (Z91.81)  Date of Onset: November 3, 2020 Date of Service: 11/17/2020     PATIENT SUMMARY   Ayo Payton is a 80year old y/o female who presents to impairments to improve balance, conditioning and to reduce pain.     Precautions:  Fall Risk     OBJECTIVE:   Observation: pt ambulates into clinic independently with SBQC; pt with notable R>L knee circumference    Sensation: intermittent tingling numbness sign and return this letter via fax as soon as possible to [phone]. If you have any questions, please contact me at Dept: [phone]    Sincerely,  Electronically signed by therapist: Etta Castellanos PT, DPT    Physician's certification required:  Yes

## 2020-11-18 ENCOUNTER — OFFICE VISIT (OUTPATIENT)
Dept: GASTROENTEROLOGY | Facility: CLINIC | Age: 85
End: 2020-11-18
Payer: MEDICARE

## 2020-11-18 ENCOUNTER — TELEPHONE (OUTPATIENT)
Dept: PHYSICAL THERAPY | Facility: HOSPITAL | Age: 85
End: 2020-11-18

## 2020-11-18 VITALS
WEIGHT: 121 LBS | DIASTOLIC BLOOD PRESSURE: 64 MMHG | HEART RATE: 88 BPM | SYSTOLIC BLOOD PRESSURE: 95 MMHG | BODY MASS INDEX: 19.44 KG/M2 | HEIGHT: 66 IN

## 2020-11-18 DIAGNOSIS — K21.00 GASTROESOPHAGEAL REFLUX DISEASE WITH ESOPHAGITIS, UNSPECIFIED WHETHER HEMORRHAGE: Primary | ICD-10-CM

## 2020-11-18 DIAGNOSIS — D64.9 ANEMIA, NORMOCYTIC NORMOCHROMIC: ICD-10-CM

## 2020-11-18 PROCEDURE — 99204 OFFICE O/P NEW MOD 45 MIN: CPT | Performed by: INTERNAL MEDICINE

## 2020-11-18 RX ORDER — SUCRALFATE ORAL 1 G/10ML
1 SUSPENSION ORAL
Qty: 420 ML | Refills: 0 | Status: SHIPPED | OUTPATIENT
Start: 2020-11-18 | End: 2020-12-02

## 2020-11-18 RX ORDER — SUCRALFATE 1 G/1
TABLET ORAL
Qty: 120 TABLET | Refills: 0 | Status: SHIPPED | OUTPATIENT
Start: 2020-11-18

## 2020-11-18 NOTE — H&P
[facility], [facility] - Gastroenterology  Clinic History and Physical     Patient presents with:  Consult: GERD      HPI:   Graham Evans is a 80year old year-old female patient of Dr. Lucho Hilario, with history of mesenteric ischemia, afib, CAD, H pylori, HTN. her reflux symptoms. The patient has had EGD in 2016 at [facility], this showed H. pylori gastritis.   She was treated for H. pylori.     The patient has a history of ischemic cardiomyopathy, mitral regurgitation, moderate to severe pulmonary hypert epidurals   • LV (left ventricular) mural thrombus    • Mesenteric ischemia (HCC) 4/18/2017   • Moderate mitral regurgitation    • Moderate to severe pulmonary hypertension ([address]    • Nasal fracture    • Occlusive mesenteric ischemia (HCC)     stent of sup Bottle 1   • apixaban (ELIQUIS) 2.5 MG Oral Tab Take 2.5 mg by mouth 2 (two) times daily. • Pravastatin Sodium 40 MG Oral Tab Take 40 mg by mouth nightly. • Probiotic Product (PROBIOTIC-10) Oral Cap Take by mouth.      • nitroGLYCERIN 0.4 MG Subling labs and imaging were reviewed and discussed with patient today. Recent Labs     08/13/18  1504 07/10/20 09/17/20  0943   RBC 3.74* 4.01 4.28   HGB 11.8* 11.96 10.9*   HCT 36.5 33.7* 36.8   MCV 97.6  --  86.0   MCH 31.6  --  25.5*   MCHC 32.3  --  29. continue tums as needed   - Discussed EGD given anemia and worsenign reflux but given age and comorbidities and dehydration in the past would like to avoid invasive procedures understanding risk of missed lesions  - Avoid constipation        Orders This Vi

## 2020-11-18 NOTE — PATIENT INSTRUCTIONS
1. Gastroesophageal reflux disease with esophagitis, unspecified whether hemorrhage  2. Anemia, normocytic normochromic      Recommend:  The pathophysiology of acid reflux was discussed. Discussed patient symptoms and triggers.  Reviewed anti-reflux measu laceration

## 2020-11-27 ENCOUNTER — OFFICE VISIT (OUTPATIENT)
Dept: PHYSICAL THERAPY | Age: 85
End: 2020-11-27
Attending: INTERNAL MEDICINE
Payer: MEDICARE

## 2020-11-27 PROCEDURE — 97110 THERAPEUTIC EXERCISES: CPT

## 2020-11-27 NOTE — PROGRESS NOTES
Diagnosis:  Ataxia (R27.0)  At high risk for falls (Z91.81)      Next MD visit: none scheduled  Fall Risk: standard         Precautions: n/a          Medication Changes since last visit?: No    Subjective: Pt reports no back pain currently.  She reports virgie

## 2020-11-30 ENCOUNTER — OFFICE VISIT (OUTPATIENT)
Dept: PHYSICAL THERAPY | Age: 85
End: 2020-11-30
Attending: INTERNAL MEDICINE
Payer: MEDICARE

## 2020-11-30 PROCEDURE — 97110 THERAPEUTIC EXERCISES: CPT

## 2020-11-30 NOTE — PROGRESS NOTES
Diagnosis:  Ataxia (R27.0)  At high risk for falls (Z91.81)      Next MD visit: none scheduled  Fall Risk: standard         Precautions: n/a          Medication Changes since last visit?: No    Subjective: Pt reports no back pain currently.  She over the we ea 5 sec holds (HOB elevated)  - supine R/L SLR 1x10 ea (HOB elevated)  - sit<>stand from mat table with UE support 1x10   Manual Therapy     -    Therapeutic Activity     -    Modalities     -                Assessment: Progressed resistance on shuttle ma

## 2020-12-04 ENCOUNTER — OFFICE VISIT (OUTPATIENT)
Dept: PHYSICAL THERAPY | Age: 85
End: 2020-12-04
Attending: INTERNAL MEDICINE
Payer: MEDICARE

## 2020-12-04 PROCEDURE — 97110 THERAPEUTIC EXERCISES: CPT

## 2020-12-04 NOTE — PROGRESS NOTES
Diagnosis:  Ataxia (R27.0)  At high risk for falls (Z91.81)      Next MD visit: none scheduled  Fall Risk: standard         Precautions: n/a          Medication Changes since last visit?: No    Subjective: Pt reports no back pain currently.        Objective ea 5 sec holds (HOB elevated)  - supine R/L SLR 1x10 ea (HOB elevated)  - sit<>stand from mat table (21 inches) with UE support 1x10 - NuStep L3 x 3 minutes (UEs and LEs) seat 7  - seated B hip abd with YTB 1x10  - seated R/L hip flexion 20x  - seated R/L

## 2020-12-08 ENCOUNTER — OFFICE VISIT (OUTPATIENT)
Dept: PHYSICAL THERAPY | Age: 85
End: 2020-12-08
Attending: INTERNAL MEDICINE
Payer: MEDICARE

## 2020-12-08 PROCEDURE — 97110 THERAPEUTIC EXERCISES: CPT

## 2020-12-08 NOTE — PROGRESS NOTES
Diagnosis:  Ataxia (R27.0)  At high risk for falls (Z91.81)      Next MD visit: none scheduled  Fall Risk: standard         Precautions: n/a          Medication Changes since last visit?: No    Subjective: Pt reports no back or knee pain currently.  She sta 1x10  - supine R/L SAQs 3# 1x10 ea 5 sec holds (HOB elevated)  - supine R/L SLR 1x10 ea (HOB elevated)  - sit<>stand from mat table (21 inches) with UE support 1x10 - NuStep L3 x 3 minutes (UEs and LEs) seat 7  - standing R/L hip abduction/hip extension 1x

## 2020-12-10 ENCOUNTER — OFFICE VISIT (OUTPATIENT)
Dept: PHYSICAL THERAPY | Age: 85
End: 2020-12-10
Attending: INTERNAL MEDICINE
Payer: MEDICARE

## 2020-12-10 PROCEDURE — 97110 THERAPEUTIC EXERCISES: CPT

## 2020-12-10 NOTE — PROGRESS NOTES
Diagnosis:  Ataxia (R27.0)  At high risk for falls (Z91.81)      Next MD visit: none scheduled  Fall Risk: standard         Precautions: n/a          Medication Changes since last visit?: No    Subjective: Pt reports no back or knee pain currently.   She st with 2.5# 2x10 ea 5 sec holds  - seated posture correct 10x  - shoulder flexion OH with wand 2x10  - seated B scap rows with Vabaduse 41 2x10  - standing B shoulder ext with V[address] 41 - not performed today  - supine R/L SAQs 3# 1x10 ea 5 sec holds (HOB elevated)  - supi Assessment:  Patient reporting improved mobility and \"feeling better\" post interventions today.        Plan: continue PT    Charges: Ex 3    Total Timed Treatment: 40 min  Total Treatment Time: 45 min

## 2020-12-14 ENCOUNTER — TELEPHONE (OUTPATIENT)
Dept: PHYSICAL THERAPY | Facility: HOSPITAL | Age: 85
End: 2020-12-14

## 2020-12-14 NOTE — TELEPHONE ENCOUNTER
-----Original Message-----  From: [email or web address]   Sent: Monday, December 14, 2020 10:16 AM  To: Tinnie Phalen <[email or web address]  Subject: Cancel     Ms. Adeline Mata,  Please cancel 12-15 appointment for [facility]

## 2020-12-15 ENCOUNTER — APPOINTMENT (OUTPATIENT)
Dept: PHYSICAL THERAPY | Age: 85
End: 2020-12-15
Attending: INTERNAL MEDICINE
Payer: MEDICARE

## 2020-12-21 ENCOUNTER — APPOINTMENT (OUTPATIENT)
Dept: PHYSICAL THERAPY | Age: 85
End: 2020-12-21
Attending: INTERNAL MEDICINE
Payer: MEDICARE

## 2020-12-23 ENCOUNTER — TELEPHONE (OUTPATIENT)
Dept: PHYSICAL THERAPY | Facility: HOSPITAL | Age: 85
End: 2020-12-23

## 2020-12-23 ENCOUNTER — APPOINTMENT (OUTPATIENT)
Dept: PHYSICAL THERAPY | Age: 85
End: 2020-12-23
Attending: INTERNAL MEDICINE
Payer: MEDICARE

## 2020-12-29 ENCOUNTER — APPOINTMENT (OUTPATIENT)
Dept: PHYSICAL THERAPY | Age: 85
End: 2020-12-29
Attending: INTERNAL MEDICINE
Payer: MEDICARE

## 2021-02-01 DIAGNOSIS — Z23 NEED FOR VACCINATION: ICD-10-CM

## 2021-03-22 NOTE — LETTER
22        To Whom It May Concern:      Radha Luciano  :  10/17/1932    []  Patient has been cleared to hold Eliquis for 2 days prior to right L5 Transforaminal epidural steroid injection. for procedure. Holding the medication(s) may put the patient at an increased risk for stroke, heart attack, or neurologic or cardiac events. []  Patient has not been cleared to hold Eliquis for procedure. If this office may be of further assistance, please do not hesitate to contact us.       Sincerely,    Alissa Payne MD    [phone]  F: [phone] Dr. Walter

## 2021-10-13 ENCOUNTER — TELEPHONE (OUTPATIENT)
Dept: PHYSICAL THERAPY | Facility: HOSPITAL | Age: 86
End: 2021-10-13

## 2021-10-13 ENCOUNTER — ORDER TRANSCRIPTION (OUTPATIENT)
Dept: PHYSICAL THERAPY | Facility: HOSPITAL | Age: 86
End: 2021-10-13

## 2021-10-13 DIAGNOSIS — R42 VERTIGO: Primary | ICD-10-CM

## 2021-10-14 ENCOUNTER — OFFICE VISIT (OUTPATIENT)
Dept: PHYSICAL THERAPY | Facility: HOSPITAL | Age: 86
End: 2021-10-14
Attending: FAMILY MEDICINE
Payer: MEDICARE

## 2021-10-14 DIAGNOSIS — R42 VERTIGO: ICD-10-CM

## 2021-10-14 PROCEDURE — 97161 PT EVAL LOW COMPLEX 20 MIN: CPT

## 2021-10-14 NOTE — PROGRESS NOTES
PHYSICAL THERAPY EVALUATION:   Referring Physician: Dr. Kasey Islas  Diagnosis: dizziness      Date of Onset: per HPI Date of Service: 10/14/2021        PATIENT SUMMARY   Deedee Jerome is a 80year old y/o female who presents to therapy today with reports o Helicobacter pylori gastritis     EGD 3/16 Dr. Elizabeth Zhou   • History of lumbar fusion     at [facility]   • History of repair of right rotator cuff     at [facility]   • History of shingles    • HTN (hypertension)    • Hyperlipidemia    • absent   Smooth Pursuit: Negative  Saccades: Negative  Gaze Evoked Nystagmus: Negative  Head Thrust: Negative  VOR screen:  WNL, asymptomatic   VOR Cancellation: Negative     Positional Testing:  [place]-Hallpike: Right: negative, asymptomatic , Left: negative need for these services furnished under this plan of treatment and while under my care.     X___________________________________________________ Date____________________    Certification From: 28/12/0388  To:1/12/2022

## 2021-10-21 ENCOUNTER — OFFICE VISIT (OUTPATIENT)
Dept: PHYSICAL THERAPY | Facility: HOSPITAL | Age: 86
End: 2021-10-21
Attending: FAMILY MEDICINE
Payer: MEDICARE

## 2021-10-21 DIAGNOSIS — R42 VERTIGO: ICD-10-CM

## 2021-10-21 PROCEDURE — 97112 NEUROMUSCULAR REEDUCATION: CPT

## 2021-10-21 NOTE — PROGRESS NOTES
Discharge Summary    Referring Physician: Kasey Islas    Diagnosis: dizziness      Pt has attended 2 visits in Physical Therapy. Subjective: No complaints of dizziness since she was last seen. Continues to sleep in recliner instead of bed.  Feels good overal

## 2022-06-22 ENCOUNTER — HOSPITAL ENCOUNTER (EMERGENCY)
Facility: HOSPITAL | Age: 87
Discharge: HOME | End: 2022-06-22
Attending: EMERGENCY MEDICINE
Payer: MEDICARE

## 2022-06-22 VITALS
TEMPERATURE: 98 F | DIASTOLIC BLOOD PRESSURE: 76 MMHG | BODY MASS INDEX: 20.49 KG/M2 | HEART RATE: 80 BPM | WEIGHT: 120 LBS | SYSTOLIC BLOOD PRESSURE: 121 MMHG | RESPIRATION RATE: 16 BRPM | OXYGEN SATURATION: 98 % | HEIGHT: 64 IN

## 2022-06-22 DIAGNOSIS — M54.50 BACK PAIN, LUMBOSACRAL: Primary | ICD-10-CM

## 2022-06-22 LAB
BILIRUB UR QL: NEGATIVE
CLARITY UR: CLEAR
COLOR UR: YELLOW
GLUCOSE UR-MCNC: NEGATIVE MG/DL
HGB UR QL STRIP.AUTO: NEGATIVE
KETONES UR-MCNC: NEGATIVE MG/DL
LEUKOCYTE ESTERASE UR QL STRIP.AUTO: NEGATIVE
NITRITE UR QL STRIP.AUTO: NEGATIVE
PH UR: 7 [PH] (ref 5–8)
PROT UR-MCNC: NEGATIVE MG/DL
SP GR UR STRIP: 1.01 (ref 1–1.03)
UROBILINOGEN UR STRIP-ACNC: 0.2

## 2022-06-22 PROCEDURE — 99283 EMERGENCY DEPT VISIT LOW MDM: CPT

## 2022-06-22 RX ORDER — TRAMADOL HYDROCHLORIDE 50 MG/1
TABLET ORAL EVERY 6 HOURS PRN
Qty: 20 TABLET | Refills: 0 | Status: SHIPPED | OUTPATIENT
Start: 2022-06-22 | End: 2022-06-29

## 2022-06-22 RX ORDER — GABAPENTIN 100 MG/1
100 CAPSULE ORAL ONCE
Status: COMPLETED | OUTPATIENT
Start: 2022-06-22 | End: 2022-06-22

## 2022-06-22 RX ORDER — TRAMADOL HYDROCHLORIDE 50 MG/1
50 TABLET ORAL ONCE
Status: COMPLETED | OUTPATIENT
Start: 2022-06-22 | End: 2022-06-22

## 2022-06-22 RX ORDER — GABAPENTIN 100 MG/1
100 CAPSULE ORAL 3 TIMES DAILY
Qty: 45 CAPSULE | Refills: 0 | Status: SHIPPED | OUTPATIENT
Start: 2022-06-22

## 2022-06-22 NOTE — ED INITIAL ASSESSMENT (HPI)
PT c/o back pain, seen at [facility] last week for uti, given abx. Has had worsening back pain.

## 2022-07-27 ENCOUNTER — TELEPHONE (OUTPATIENT)
Dept: NEUROLOGY | Facility: CLINIC | Age: 87
End: 2022-07-27

## 2022-07-27 ENCOUNTER — OFFICE VISIT (OUTPATIENT)
Dept: PHYSICAL MEDICINE AND REHAB | Facility: CLINIC | Age: 87
End: 2022-07-27
Payer: MEDICARE

## 2022-07-27 VITALS — BODY MASS INDEX: 20.49 KG/M2 | HEIGHT: 64 IN | WEIGHT: 120 LBS

## 2022-07-27 DIAGNOSIS — I25.10 CORONARY ARTERY DISEASE INVOLVING NATIVE HEART WITHOUT ANGINA PECTORIS, UNSPECIFIED VESSEL OR LESION TYPE: ICD-10-CM

## 2022-07-27 DIAGNOSIS — Z87.19 HX OF GASTROESOPHAGEAL REFLUX (GERD): ICD-10-CM

## 2022-07-27 DIAGNOSIS — I48.0 PAROXYSMAL ATRIAL FIBRILLATION (HCC): ICD-10-CM

## 2022-07-27 DIAGNOSIS — Z79.01 ANTICOAGULANT LONG-TERM USE: ICD-10-CM

## 2022-07-27 DIAGNOSIS — M54.16 LUMBAR RADICULOPATHY: Primary | ICD-10-CM

## 2022-07-27 PROCEDURE — 99204 OFFICE O/P NEW MOD 45 MIN: CPT | Performed by: PHYSICAL MEDICINE & REHABILITATION

## 2022-07-27 NOTE — TELEPHONE ENCOUNTER
Right L5(L5-S1) TFESI CPT Code: 34523, 77294   Status: Approved- No pre-certification required. Per Medicare Guidelines; request is a covered benefit and pre-certification is not require. All Medicare coverage is based on Medical Necessity.    Notified nursing for scheduling

## 2022-07-27 NOTE — TELEPHONE ENCOUNTER
LMTCB 1st attempt    Procedure: Right L5(L5-S1) Transforaminal epidural steroid injection  Anesthesia: Local  Dx: M54.16  Location: [address] [place] [address]  CPT Codes: [id number], 29910

## 2022-07-29 NOTE — TELEPHONE ENCOUNTER
Status of Anticoag  [x] Clearance pending to hold Eliquis for 2D prior to procedure to Dr. Shay Leigh, cardiologist from [facility].  F: [phone]  [] Clearance approved  [] Clearance denied

## 2022-08-03 ENCOUNTER — TELEPHONE (OUTPATIENT)
Dept: PHYSICAL MEDICINE AND REHAB | Facility: CLINIC | Age: 87
End: 2022-08-03

## 2022-08-03 NOTE — TELEPHONE ENCOUNTER
Patient cleared to hold Eliquis for 2 days prior to injection by Dr. Nubia Villareal. Refer to TE 7/27/22.

## 2022-08-06 ENCOUNTER — HOSPITAL ENCOUNTER (OUTPATIENT)
Dept: CT IMAGING | Facility: HOSPITAL | Age: 87
Discharge: HOME | End: 2022-08-06
Attending: PHYSICAL MEDICINE & REHABILITATION
Payer: MEDICARE

## 2022-08-06 DIAGNOSIS — M54.16 LUMBAR RADICULOPATHY: ICD-10-CM

## 2022-08-06 PROCEDURE — 72131 CT LUMBAR SPINE W/O DYE: CPT | Performed by: PHYSICAL MEDICINE & REHABILITATION

## 2022-08-08 ENCOUNTER — TELEPHONE (OUTPATIENT)
Dept: PHYSICAL MEDICINE AND REHAB | Facility: CLINIC | Age: 87
End: 2022-08-08

## 2022-08-08 NOTE — TELEPHONE ENCOUNTER
----- Message from Manfred Crane MD sent at 8/7/2022  2:29 PM CDT -----  I personally reviewed a CT lumbar spine showing near complete collapse of the T12 vertebral body and flattening of the L5 vertebral body to about one third of its original height. No retropulsion into the canal.  No aggressive looking lesions suggesting metastasis or osteomyelitis. There is also severe disc space collapse and an uninstrumented bony facet fusion at L4-5. There is superjacent central canal stenosis at L3-4 multifactorial basis. There is subadjacent severe L5-S1 foraminal stenosis on the right. Compared to the x-ray report of from [facility] on June 16, there appears to be more L5 vertebral body loss than previously, but the June films are unavailable for my review. The radiologists report indicates fat stranding indicating ongoing L5 collapse. Please let the patient know that the lumbar CT scan shows she has severe osteoporosis and collapse of 2 of her vertebrae. This is similar to her x-ray done in June, but if she is not currently being treated for osteoporosis she should bring this to the attention of her primary physician. There is also a pinched nerve we can see on the right. She should keep her appointment for the epidural injection next week.

## 2022-08-11 ENCOUNTER — OFFICE VISIT (OUTPATIENT)
Dept: SURGERY | Facility: CLINIC | Age: 87
End: 2022-08-11

## 2022-08-11 DIAGNOSIS — M54.16 LUMBAR RADICULOPATHY: Primary | ICD-10-CM

## 2022-08-11 PROCEDURE — 64483 NJX AA&/STRD TFRM EPI L/S 1: CPT | Performed by: PHYSICAL MEDICINE & REHABILITATION

## 2022-08-11 NOTE — PROCEDURES
Preoperative Diagnosis:  (M54.16) Lumbar radiculopathy  (primary encounter diagnosis)       Postoperative Diagnosis:  (M54.16) Lumbar radiculopathy  (primary encounter diagnosis)       Procedures: Right L5 Transforaminal epidural steroid injection under fluoroscopic guidance and contrast enhancement. Surgeon:  Jonathan Ontiveros M.D. Anesthesia:  Local      OPERATIVE PROCEDURE:  The patient was consented. She was brought into the operating suite and placed on the fluoroscopy table in the prone position. She was sterilely prepped and draped in routine fashion. The right L5 foramen was identified. The overlying skin was anesthetized. A 22-gauge spinal needle was introduced and directed towards the foramen. Needle position was verified using fluoroscopy and radiographic contrast showing an epidurogram.  There was no withdrawal of blood or CSF from the needle. Radiographic interpretation was that the needle was in proper position for a transforaminal epidural steroid injection. I placed as mixture of 2mL of 6mg/mL Celestone and 2mL of 1% preservative-free lidocaine into the epidural space. The patient tolerated the procedure well without any immediate complications. She was given discharge instructions and is to follow up with me in approximately two weeks.

## 2022-09-01 ENCOUNTER — MED REC SCAN ONLY (OUTPATIENT)
Dept: PHYSICAL MEDICINE AND REHAB | Facility: CLINIC | Age: 87
End: 2022-09-01

## 2022-09-06 ENCOUNTER — OFFICE VISIT (OUTPATIENT)
Dept: PHYSICAL MEDICINE AND REHAB | Facility: CLINIC | Age: 87
End: 2022-09-06
Payer: MEDICARE

## 2022-09-06 VITALS
DIASTOLIC BLOOD PRESSURE: 72 MMHG | BODY MASS INDEX: 19.97 KG/M2 | HEART RATE: 82 BPM | WEIGHT: 117 LBS | OXYGEN SATURATION: 99 % | HEIGHT: 64 IN | SYSTOLIC BLOOD PRESSURE: 122 MMHG

## 2022-09-06 DIAGNOSIS — R26.9 GAIT DISTURBANCE: ICD-10-CM

## 2022-09-06 DIAGNOSIS — Z79.01 ANTICOAGULANT LONG-TERM USE: ICD-10-CM

## 2022-09-06 DIAGNOSIS — Z87.19 HX OF GASTROESOPHAGEAL REFLUX (GERD): ICD-10-CM

## 2022-09-06 DIAGNOSIS — I25.10 CORONARY ARTERY DISEASE INVOLVING NATIVE HEART WITHOUT ANGINA PECTORIS, UNSPECIFIED VESSEL OR LESION TYPE: ICD-10-CM

## 2022-09-06 DIAGNOSIS — I48.0 PAROXYSMAL ATRIAL FIBRILLATION (HCC): ICD-10-CM

## 2022-09-06 DIAGNOSIS — M54.16 LUMBAR RADICULOPATHY: Primary | ICD-10-CM

## 2022-09-06 PROCEDURE — 99213 OFFICE O/P EST LOW 20 MIN: CPT | Performed by: PHYSICAL MEDICINE & REHABILITATION

## 2022-10-05 ENCOUNTER — MED REC SCAN ONLY (OUTPATIENT)
Dept: PHYSICAL MEDICINE AND REHAB | Facility: CLINIC | Age: 87
End: 2022-10-05

## 2022-12-23 ENCOUNTER — ORDER TRANSCRIPTION (OUTPATIENT)
Dept: PHYSICAL THERAPY | Facility: HOSPITAL | Age: 87
End: 2022-12-23

## 2022-12-23 DIAGNOSIS — R42 VERTIGO: Primary | ICD-10-CM

## 2023-01-03 ENCOUNTER — APPOINTMENT (OUTPATIENT)
Dept: PHYSICAL THERAPY | Facility: HOSPITAL | Age: 88
End: 2023-01-03
Attending: FAMILY MEDICINE
Payer: MEDICARE

## 2023-01-04 ENCOUNTER — APPOINTMENT (OUTPATIENT)
Dept: PHYSICAL THERAPY | Facility: HOSPITAL | Age: 88
End: 2023-01-04
Payer: MEDICARE

## 2023-01-13 ENCOUNTER — APPOINTMENT (OUTPATIENT)
Dept: PHYSICAL THERAPY | Facility: HOSPITAL | Age: 88
End: 2023-01-13
Payer: MEDICARE

## 2023-03-30 NOTE — LETTER
[address], [facility]   Date:   9/6/2022     Name:   Ismael Mcintyre    YOB: 1932   MRN:   [id number]       WHERE IS YOUR PAIN NOW? Remy the areas on your body where you feel the described sensations. Use the appropriate symbol. [place] the areas of radiation. Include all affected areas. Just to complete the picture, please draw in the face. ACHE:  ^ ^ ^   NUMBNESS:  0000   PINS & NEEDLES:  = = = =                              ^ ^ ^                       0000              = = = =                                    ^ ^ ^                       0000            = = = =      BURNING:  XXXX   STABBING: ////                  XXXX                ////                         XXXX          ////     Please remy the line below indicating your degree of pain right now  with 0 being no pain 10 being the worst pain possible.                                          0             1             2              3             4              5              6              7             8             9             10         Patient Signature: ----- Message from Candy Almodovar sent at 3/30/2023  4:49 PM CDT -----  Type:  RX Refill Request    Who Called: Pt     Refill or New Rx:Refill     RX Name and Strength:oxyCODONE-acetaminophen (PERCOCET)  mg per tablet    How is the patient currently taking it? Sig - Route: Take 1 tablet by mouth every 6 (six) hours as needed for Pain. - Oral  Sent to pharmacy as: oxyCODONE-acetaminophen (PERCOCET)  mg per tablet        Is this a 30 day or 90 day RX:    Preferred Pharmacy with phone number:[facility]    Local or Mail Order: Local     Ordering Provider: Philip Carrizales MD    Would the patient rather a call back or a response via [facility]?  Call back     Best Call Back Number: [phone] (mobile)     Additional Information:

## 2023-04-04 ENCOUNTER — OFFICE VISIT (OUTPATIENT)
Dept: PHYSICAL THERAPY | Facility: HOSPITAL | Age: 88
End: 2023-04-04
Payer: MEDICARE

## 2023-04-04 DIAGNOSIS — R42 VERTIGO: ICD-10-CM

## 2023-04-04 PROCEDURE — 95992 CANALITH REPOSITIONING PROC: CPT

## 2023-04-04 PROCEDURE — 97163 PT EVAL HIGH COMPLEX 45 MIN: CPT

## 2023-04-11 ENCOUNTER — APPOINTMENT (OUTPATIENT)
Dept: PHYSICAL THERAPY | Facility: HOSPITAL | Age: 88
End: 2023-04-11
Payer: MEDICARE

## 2023-09-21 ENCOUNTER — OFFICE VISIT (OUTPATIENT)
Dept: PHYSICAL THERAPY | Facility: HOSPITAL | Age: 88
End: 2023-09-21
Attending: FAMILY MEDICINE
Payer: MEDICARE

## 2023-09-21 ENCOUNTER — ORDER TRANSCRIPTION (OUTPATIENT)
Dept: PHYSICAL THERAPY | Facility: HOSPITAL | Age: 88
End: 2023-09-21

## 2023-09-21 DIAGNOSIS — R42 VERTIGO: Primary | ICD-10-CM

## 2023-09-21 PROCEDURE — 97162 PT EVAL MOD COMPLEX 30 MIN: CPT

## 2023-09-21 PROCEDURE — 95992 CANALITH REPOSITIONING PROC: CPT

## 2023-09-21 NOTE — PROGRESS NOTES
PHYSICAL THERAPY EVALUATION:   Referring Physician: Dr. Lara Russo  Diagnosis: R horizontal BPPV      Date of Onset: per HPI Date of Service: 9/21/2023        PATIENT SUMMARY   Darrell Agrawal is a 80year old y/o female who presents to therapy today with reports of dizziness with changes in position for past several days. History of current condition: Pt reports recurrence of dizziness starting over the weekend (was last seen in PT in April of this year for similar issue). Tried Meclizine which made her sleepy but did not help her symptoms. Noted increased joint pain in legs with use. Has stopped taking Meclizine and her joint pain improved. She notes symptoms of spinning dizziness upon sitting up in bed. Lasts ~30 seconds at a time. Thought it was improving but then work up this AM and noted spinning sensation lying on her R side again lasting ~30 seconds. It was recommended she try Benadryl for her symptoms but she did not try this due to SE of drowsiness. Has some issues with balance at baseline. Symptoms with cough/sneeze or loud noise: no   Falls: denies falls. Hx of migraines:  No  Hx of vision issue:  No  Hx of hearing issues:   Yes: hard of hearing    Dizziness: Current 0/10,  Best 0/10, Worst 6/10   Quality: room spinning, imbalance  Aggravates: Supine to/from sit and Rolling  Relieves: Not moving    Social history:   Lives with  - son lives on second floor of home. Prior level of function: limited physical activity 2/2 pain and recent wound   Pt goals include to resolve symptoms. Past medical history was reviewed with [facility].  Significant findings include   Past Medical History:   Diagnosis Date    Atrial fibrillation ([address]     CAD (coronary artery disease)     PCI of LAD and Cx 2008    Esophageal reflux     H/O vein stripping     Helicobacter pylori gastritis     EGD 3/16 Dr. Melany Pollock    History of lumbar fusion     at [facility]    History of repair of right rotator cuff     at [facility]    History of shingles     HTN (hypertension)     Hyperlipidemia     Hypothyroid     Ischemic cardiomyopathy     L-S radiculopathy     has been seen by Dr. Juan M Jalloh; no epidurals    LV (left ventricular) mural thrombus     Mesenteric ischemia ([address] 4/18/2017    Moderate mitral regurgitation     Moderate to severe pulmonary hypertension (HCC)     Nasal fracture     Occlusive mesenteric ischemia (HCC)     stent of superior mesenteric artery    Pacemaker     S/P PASCUAL-BSO     Sensory peripheral neuropathy 7/10/2017    T12 compression fracture (HCC)     TIA (transient ischemic attack)     8/14; neg carotids; found to have LV thrombus    Vestibulopathy, left              ASSESSMENT:      Barak Macedo presents today with recent onset of rotary vertigo triggered by changes in position that is brief in duration. +hx of BPPV in past. Oculomotor exam WNL. Positional testing reveals findings consistent with R horizontal cupulolithiasis. BBQ roll performed x2 with good tolerance. Pt and daughter verbalize understanding of material taught today. Pt ambulates out of clinic slowly with rollator but without additional physical assist. In agreement with functional outcome measures and clinical rationale, this evaluation involved Moderate Complexity decision making due to 1-2 personal factors/comorbidities, 3 body structures involved/activity limitations, and unstable symptoms including  varying symptom levels . Pt. would benefit from skilled Physical Therapy to address the above impairments to resolve BPPV in order to facilitate return to PLOF.      Precautions:  Fall Risk      OBJECTIVE:   Physical Exam:  Posture/Observation: pleasant 81 y/o; NAD                   Neuro Screen:    Sensation: light touch sensation grossly intact BUE and BLE    Cervical spine ROM: FlexWFL, Ext MILDLY RESTRICTED, R SB MODERATELY RESTRICTED, L SB MODERATELY RESTRICTED , R ROT MILDLY RESTRICTED, L ROT MILDLY RESTRICTED   Adverse neuro signs with ROM: N      Oculomotor/Vestibular Exam:  Spontaneous Nystagmus: absent   Smooth Pursuit: Negative  Saccades: Negative  Gaze Evoked Nystagmus: Negative  Head Thrust: Negative  VOR screen:  WNL, asymptomatic   VOR Cancellation: Negative   Cover/Uncover: Negative   Cross Cover: Negative       Positional Testing:  [place]-Hallpike: Right: + for severe L horizontal nystagmus - pt denies symptoms. Nystagmus duration ~45 seconds. Left: NT  Roll Test [facility]): + for severe ageotropic nystagmus L worse than R consistent in latency and duration to R horizontal cupulolithiasis   BBQ roll performed x2 with vibration. Nystagmus and symptoms improved but not resolved with second maneuver. Functional Mobility:  Functional Mobility/Gait:slow and cautious with rollator        Today's treatment: patient education: exam findings and POC discussed with pt and daughter. Charges: PT Eval x1, 1 CRM      Total Timed Treatment: 45 min     Total Treatment Time: 45 min            PLAN OF CARE:    Goals to be met within POC or 90 days:    Positional testing to be negative   Pt will perform all aspects of bed mobility asymptomatically. Frequency / Duration: Patient will be seen for 1 x/week or a total of 6 visits over a 90 day period. Treatment will include: Home exercise program development and instruction, Patient/family education, Canalith Repositioning Maneuver, Eye/head coordination exercises, Sensory organization training, Optokinetic stimulation, Neuromuscular Re-education, Therapeutic Exercise; Gait Training; Manual Therapy; Therapeutic Activity, HEP prescription. Education or treatment limitation: None  Rehab Potential: good    Patient/Family was advised of these findings, precautions, and treatment options and has agreed to actively participate in planning and for this course of care.     Thank you for your referral.  If you have any questions, please contact me at Dept: [phone]    Sincerely,  Blas Sands Leticia PT, NCS    Electronically signed by therapist: Breana Agudelo PT    [de-identified] certification required: Yes  I certify the need for these services furnished under this plan of treatment and while under my care. X___________________________________________________ Date____________________    Certification From: 0/34/3443  To:12/20/2023 21st Century Cures Act Notice to Patient: Medical documents like this are made available to patients in the interest of transparency. However, be advised this is a medical document and it is intended as haym-ii-utam communication between your medical providers. This medical document may contain abbreviations, assessments, medical data, and results or other terms that are unfamiliar. Medical documents are intended to carry relevant information, facts as evident, and the clinical opinion of the practitioner. As such, this medical document may be written in language that appears blunt or direct. You are encouraged to contact your medical provider and/or [facility] Patient Experience if you have any questions about this medical document.

## 2023-09-26 ENCOUNTER — APPOINTMENT (OUTPATIENT)
Dept: PHYSICAL THERAPY | Facility: HOSPITAL | Age: 88
End: 2023-09-26
Attending: FAMILY MEDICINE
Payer: MEDICARE

## 2023-09-27 ENCOUNTER — APPOINTMENT (OUTPATIENT)
Dept: PHYSICAL THERAPY | Facility: HOSPITAL | Age: 88
End: 2023-09-27
Attending: FAMILY MEDICINE
Payer: MEDICARE

## 2023-10-04 ENCOUNTER — APPOINTMENT (OUTPATIENT)
Dept: PHYSICAL THERAPY | Facility: HOSPITAL | Age: 88
End: 2023-10-04
Attending: FAMILY MEDICINE
Payer: MEDICARE

## 2023-10-11 ENCOUNTER — APPOINTMENT (OUTPATIENT)
Dept: PHYSICAL THERAPY | Facility: HOSPITAL | Age: 88
End: 2023-10-11
Attending: FAMILY MEDICINE
Payer: MEDICARE

## 2023-10-19 ENCOUNTER — APPOINTMENT (OUTPATIENT)
Dept: PHYSICAL THERAPY | Facility: HOSPITAL | Age: 88
End: 2023-10-19
Attending: FAMILY MEDICINE
Payer: MEDICARE

## 2023-10-26 ENCOUNTER — APPOINTMENT (OUTPATIENT)
Dept: PHYSICAL THERAPY | Facility: HOSPITAL | Age: 88
End: 2023-10-26
Attending: FAMILY MEDICINE
Payer: MEDICARE

## 2023-12-12 ENCOUNTER — OFFICE VISIT (OUTPATIENT)
Dept: PHYSICAL THERAPY | Facility: HOSPITAL | Age: 88
End: 2023-12-12
Attending: FAMILY MEDICINE
Payer: MEDICARE

## 2023-12-12 ENCOUNTER — ORDER TRANSCRIPTION (OUTPATIENT)
Dept: OCCUPATIONAL MEDICINE | Facility: HOSPITAL | Age: 88
End: 2023-12-12

## 2023-12-12 DIAGNOSIS — H81.10 BENIGN PAROXYSMAL POSITIONAL VERTIGO, UNSPECIFIED LATERALITY: Primary | ICD-10-CM

## 2023-12-12 PROCEDURE — 97112 NEUROMUSCULAR REEDUCATION: CPT

## 2023-12-12 NOTE — PROGRESS NOTES
Progress Summary    Referring Physician: Jose Rafael Montaño    Diagnosis: R horizontal BPPV      Pt has attended 2 visits in Physical Therapy. Subjective: Pt's daughter reports patient was hospitalized for CHF and low BP. She had to have fluid drained. Discharged last week Tuesday. On Saturday pt experienced symptoms of vertigo. Took Meclizine twice that day. She reports feeling nauseous with the symptoms. Occurred with changes in position- has been unable to lie flat. Continues to experience symptoms with changes in position. Feels generally off balance. She has an appointment with an ENT on Thursday for ear wax build up that her PCP was unable to manage in their office. Assessment: Pt returns to PT for recurrence of vertigo symptoms. She had previously cancelled her follow-up appointments as her symptoms had resolved. Of note she currently has significant ear wax build up per daughter's report. Positional testing generally negative. No nystagmus to support diagnosis of BPPV today. Pt would benefit from recheck next session however to ensure today was not a false negative. She will be seen in the AM to improve probability of detection. Pt and daughter verbalize understanding of material taught today and are in agreement with plan. Objective:   Harry Reynagaors: R pt reports symptoms of \"eyes moving\" she is unclear about duration and temporal pattern. No nystagmus noted- tested x2 with same result. L: negative, asymptomatic   Roll test: pt report symptoms of \"eyes moving\" with head turned R. She denies these symptoms with head L. No nystagmus noted, tested x2 with same result. Pt symptomatic upon sitting up from R DH only but again unclear on description and temporal pattern of symptoms - no nystagmus was seen.      Pt and daughter educated on findings today and POC    Goals:   Goals to be met within POC or 90 days:    Positional testing to be negative in progress   Pt will perform all aspects of bed mobility asymptomatically. In progress       Charges: 1 NMR     Total timed treatment: 15 mins  Total treatment time: 15 mins      Rehab Potential: good    Plan: Continue skilled Physical Therapy 1 x/week or a total of 8 visits over a 90 day period. Treatment will include: Home exercise program development and instruction, Patient/family education, Canalith Repositioning Maneuver, Eye/head coordination exercises, Sensory organization training, Optokinetic stimulation, Neuromuscular Re-education, Therapeutic Exercise; Gait Training; Manual Therapy; Therapeutic Activity, HEP prescription. Patient/Family/Caregiver was advised of these findings, precautions, and treatment options and has agreed to actively participate in planning and for this course of care. Thank you for your referral. If you have any questions, please contact me at Dept: [phone]. Sincerely,    Yeimi Alvarenga PT, NCS    Electronically signed by therapist: Yeimi Alvarenga PT    Physician's certification required: Yes  Please co-sign or sign and return this letter via fax as soon as possible to [phone]  I certify the need for these services furnished under this plan of treatment and while under my care. X___________________________________________________ Date____________________    Certification From: 83/41/3564  To:3/11/2024          21st Century Cures Act Notice to Patient: Medical documents like this are made available to patients in the interest of transparency. However, be advised this is a medical document and it is intended as sorn-fj-joyb communication between your medical providers. This medical document may contain abbreviations, assessments, medical data, and results or other terms that are unfamiliar. Medical documents are intended to carry relevant information, facts as evident, and the clinical opinion of the practitioner. As such, this medical document may be written in language that appears blunt or direct.  You are encouraged to contact your medical provider and/or [facility] if you have any questions about this medical document.

## 2023-12-19 ENCOUNTER — OFFICE VISIT (OUTPATIENT)
Dept: PHYSICAL THERAPY | Facility: HOSPITAL | Age: 88
End: 2023-12-19
Attending: FAMILY MEDICINE
Payer: MEDICARE

## 2023-12-19 PROCEDURE — 97112 NEUROMUSCULAR REEDUCATION: CPT

## 2023-12-19 NOTE — PROGRESS NOTES
Diagnosis: R horizontal BPPV    Visit (# authorized):   11 per POC (Medicare)                       Referring Physician: Rasheed Ramos    Precautions: at risk of falls     Medication changes since last visit?:  No    Subjective: Pt reports being asymptomatic for 2 days. Also notes improvements in balance. She had her ears cleared by her ENT on Thursday last week which daughter thinks may have also helped. Objective:    Date  12/19          Visit Number  3          NMR x          Ther EX           Ther Act           Gait Training           CRM            Manual             Additional Treatment Information:    Cristy Alexandr: R: negative, asymptomatic; L negative, asymptomatic   Roll test: negative, asymptomatic       Patient Education:  Exam findings discussed with pt and daughter. Assessment:   No evidence of BPPV. Pt reports being asymptomatic for 2 days so likely resolved on own. May benefit from one final testing should pt experience recurrence of symptoms. Pt and dtr verbalized understanding of material taught today. Plan: pt to monitor for symptoms and follow up if recurrence occurs. DC in 1 month if pt remains asymptomatic. Charges: 1 NMR       Total Timed Treatment: 10 min  Total Treatment Time: 10 min      21st Century Cures Act Notice to Patient: Medical documents like this are made available to patients in the interest of transparency. However, be advised this is a medical document and it is intended as immj-aa-lqrx communication between your medical providers. This medical document may contain abbreviations, assessments, medical data, and results or other terms that are unfamiliar. Medical documents are intended to carry relevant information, facts as evident, and the clinical opinion of the practitioner. As such, this medical document may be written in language that appears blunt or direct.  You are encouraged to contact your medical provider and/or [facility] Patient Experience if you have any questions about this medical document.

## 2023-12-27 ENCOUNTER — OFFICE VISIT (OUTPATIENT)
Dept: PHYSICAL MEDICINE AND REHAB | Facility: CLINIC | Age: 88
End: 2023-12-27
Payer: MEDICARE

## 2023-12-27 VITALS — BODY MASS INDEX: 18.78 KG/M2 | HEIGHT: 64 IN | WEIGHT: 110 LBS

## 2023-12-27 DIAGNOSIS — Z79.01 ANTICOAGULANT LONG-TERM USE: ICD-10-CM

## 2023-12-27 DIAGNOSIS — M17.11 PRIMARY OSTEOARTHRITIS OF RIGHT KNEE: Primary | ICD-10-CM

## 2023-12-27 DIAGNOSIS — Z87.19 HX OF GASTROESOPHAGEAL REFLUX (GERD): ICD-10-CM

## 2023-12-27 DIAGNOSIS — I25.10 CORONARY ARTERY DISEASE INVOLVING NATIVE HEART WITHOUT ANGINA PECTORIS, UNSPECIFIED VESSEL OR LESION TYPE: ICD-10-CM

## 2023-12-27 DIAGNOSIS — I48.0 PAROXYSMAL ATRIAL FIBRILLATION (HCC): ICD-10-CM

## 2023-12-27 PROCEDURE — 99213 OFFICE O/P EST LOW 20 MIN: CPT | Performed by: PHYSICAL MEDICINE & REHABILITATION

## (undated) NOTE — IP AVS SNAPSHOT
[address]  [address] [facility] ~ [phone]                Discharge Summary   3/23/2017    Clare Deleon           Admission Information        Provider Department    3/23/2017 Zulema Issa MD [facility] Chad Ngo Commonly known as:  [facility]        TAKE 1 TABLET TWICE A DAY WITH MEALS    Medardo Bautista     [    ]    [    ]    [    ]    [    ]       Enalapril Maleate 5 MG Tabs   Commonly known as:  VASOTEC        Take 1 tablet (5 mg total) by mouth daily.     Tabitha Thomas, ? If the groin site was used, avoid repeated stair use and excessive walking for the next 24 hours  ?  Avoid making critical decisions or signing legal documents today    What is Normal?  ? A small lump at the procedure site associated with mild tenderness ? You may resume your present diet, unless otherwise specified by your physician. ? You may resume all of your medications as prescribed, unless otherwise directed by your physician. A list of your medications was provided to you at discharge. ?  Contact ALT Bilirubin,Total Total Protein Albumin Sodium Potassium Chloride    -- -- -- -- (03/23/17)  138 (03/23/17)  4.0 (03/23/17)  100      Radiology Exams     None         Additional Information       We are concerned for your overall well being:    - If you Most common side effects: Dizziness or feeling lightheaded (especially with standing), heart rate changes, headaches, nausea/vomiting   What to report to your healthcare team:  Dizziness, nausea, chest pain, weakness, numbness           Water Pills     fur (itching, rash, hives)   What to report to your healthcare team: Pain, nausea/vomiting, no bowel movement in 2+ days, diarrhea           Endocrine Medications     LEVOTHYROXINE SODIUM 75 MCG Oral Tab         Use: Regulate metabolism and inflammation   Most

## (undated) NOTE — MR AVS SNAPSHOT
[facility]  [address] [place] [address] [phone]               Thank you for choosing us for your health care visit with Nayla Roper MD.  We are glad to serve you and happy to provide you with this summary of yo Anything that ends in zole.  weakness    Metronidazole Dizziness    Dizziness     Morphine                 Today's Vital Signs     BP Pulse Weight             106/54 mmHg 54 124 lb 3.2 oz (56.337 kg)            Current Medications          This list is acc

## (undated) NOTE — LETTER
[address] [facility]  [address], [address]  [address] [place] [address] [phone]        Dear Dr. Tabitha Thomas,      I had the pleasure of seeing your patient, Tabitha Ngo on 6/28/2017.      Below please find a summary of LEVOTHYROXINE SODIUM 75 MCG Oral Tab TAKE 1 TABLET BY MOUTH BEFORE BREAKFAST. Disp: 90 tablet Rfl: 3   furosemide 20 MG Oral Tab Take 1 tablet (20 mg total) by mouth daily.  Disp: 90 tablet Rfl: 3   CARVEDILOL 6.25 MG Oral Tab TAKE 1 TABLET TWICE A DAY WITH Family History   Problem Relation Age of Onset   • Heart Disorder Father    • Lipids Father    • Hypertension Father    • Hypertension Sister    • Heart Disorder Brother      CAD;CABG   • Cancer Daughter      non-hodgkins lymphoma   • Diabetes Son    • Hea Higher Integrative Functions:  Alert, Oriented *3, Fluent, conversational, repetition and naming intact. Short Term Memory intact. Can recite history and follow commands.   Cranial Nerves: Pupils Equal, Round and reactive, Extra Ocular movements intact, fac Vitamin B1, Thiamine, Serum          Order Comments:              Vitamin B1, Thiamine, Serum  CT BRAIN OR HEAD (29368)      cimetidine 200 MG Oral Tab Take 200 mg by mouth 4 (four) times daily.  Disp:  Rfl:    ELIQUIS 5 MG Oral Tab Take 5 mg by mouth 2

## (undated) NOTE — LETTER
Dear Dr. Caroline Louis    This letter is to inform you that Tesha Spring has been attending Physical Therapy with me. See below for my most recent plan of care.        Patient Name: Tesha Spring, : 10/17/1932, MRN: [id number]   Date:  2017  Ref Electronically signed by therapist: Rusty Walters PT

## (undated) NOTE — LETTER
Dear Dr. Caroline Louis    This letter is to inform you that Tesha Spring has been attending Physical Therapy with me. See below for my most recent plan of care.        Patient Name: Tesha Spring, : 10/17/1932, MRN: [id number]   Date:  2017  Ref ___4____6. Standing with eyes closed ( 4 10sec, 3 10sec supervision, 2 3sec, 1 <3sec, 0 needs help from falling)  ___4____7.  Standing with feet together  (4 1min, 3 1 min supervision, 2 30sec, 2 needs help to attain position and 15sec, 0 need help <15sec) Plan: Continue skilled Physical Therapy Pt to follow up with MD and MD to deem if pt will continue with PT. Pt to be on hold for 3-4 weeks.         Patient was advised of these findings, precautions, and treatment options and has agreed to actively particip

## (undated) NOTE — MR AVS SNAPSHOT
[facility] [address]  [address] [place] [address] [phone]               Thank you for choosing us for your health care visit with Dorothy Restrepo MD.  We are glad to serve you and happy to provide you with this summary of yo acquired. Please contact the Patient Business Office at [phone] if you have any questions related to insurance coverage. Thank you.          Reason for Today's Visit     Abdominal Pain     Gastro-esophageal Reflux     Constipation           Medical Iss Take 1 tablet (40 mg total) by mouth nightly. Commonly known as:  PRAVACHOL           Vitamin D3 1000 units Caps   Take 2 tablets by mouth daily.                    MyChart               Educational Information     Healthy Diet and Regular Exercise  The F

## (undated) NOTE — LETTER
[address], [facility]   Date:   7/27/2022     Name:   Darrell Agrawal    YOB: 1932   MRN:   [id number]       WHERE IS YOUR PAIN NOW? Alejandra the areas on your body where you feel the described sensations. Use the appropriate symbol. Nadia Canal the areas of radiation. Include all affected areas. Just to complete the picture, please draw in the face. ACHE:  ^ ^ ^   NUMBNESS:  0000   PINS & NEEDLES:  = = = =                              ^ ^ ^                       0000              = = = =                                    ^ ^ ^                       0000            = = = =      BURNING:  XXXX   STABBING: ////                  XXXX                ////                         XXXX          ////     Please alejandra the line below indicating your degree of pain right now  with 0 being no pain 10 being the worst pain possible.                                          0             1             2              3             4              5              6              7             8             9             10         Patient Signature:

## (undated) NOTE — LETTER
[address] [place] [address]  Authorization for Invasive Procedures  1.  I hereby authorize Dr. Marcelo Denis** , my physician and whomever may be designated as the doctor's assistant, to perform the following operation and/or procedure:  *Shabnam performed for the purposes of advancing medicine, science, and/or education, provided my identity is not revealed. If the procedure has been videotaped, the physician/surgeon will obtain the original videotape.  The hospital will not be responsible for stor My signature below affirms that prior to the time of the procedure, I have explained to the patient and/or her legal representative, the risks and benefits involved in the proposed treatment and any reasonable alternative to the proposed treatment.  I have

## (undated) NOTE — LETTER
[facility] [address], [facility]   Date:   12/27/2023     Name:   Rochelle Davalos    YOB: 1932   MRN:   [id number]       [facility]? Alejandra the areas on your body where you feel the described sensations. Use the appropriate symbol. Antonio Wilkins the areas of radiation. Include all affected areas. Just to complete the picture, please draw in the face. ACHE:  ^ ^ ^   NUMBNESS:  0000   PINS & NEEDLES:  = = = =                              ^ ^ ^                       0000              = = = =                                    ^ ^ ^                       0000            = = = =      BURNING:  XXXX   STABBING: ////                  XXXX                ////                         XXXX          ////     Please alejandra the line below indicating your degree of pain right now  with 0 being no pain 10 being the worst pain possible.                                          0             1             2              3             4              5              6              7             8             9             10         Patient Signature:

## (undated) NOTE — MR AVS SNAPSHOT
[facility]  [address] [place] [address] [phone]               Thank you for choosing us for your health care visit with Remy Varma MD.  We are glad to serve you and happy to provide you with this summary of yo taking this medication, and follow the directions you see here. Enalapril Maleate 2.5 MG Tabs   TK 1 T PO BID   What changed:  Another medication with the same name was removed.  Continue taking this medication, and follow the directions you see h